# Patient Record
Sex: MALE | Race: WHITE | NOT HISPANIC OR LATINO | URBAN - METROPOLITAN AREA
[De-identification: names, ages, dates, MRNs, and addresses within clinical notes are randomized per-mention and may not be internally consistent; named-entity substitution may affect disease eponyms.]

---

## 2018-12-17 ENCOUNTER — INPATIENT (INPATIENT)
Facility: HOSPITAL | Age: 43
LOS: 4 days | Discharge: HOME | End: 2018-12-22
Attending: INTERNAL MEDICINE | Admitting: INTERNAL MEDICINE

## 2018-12-17 VITALS
SYSTOLIC BLOOD PRESSURE: 125 MMHG | DIASTOLIC BLOOD PRESSURE: 87 MMHG | OXYGEN SATURATION: 98 % | WEIGHT: 130.07 LBS | RESPIRATION RATE: 18 BRPM | TEMPERATURE: 98 F | HEART RATE: 68 BPM | HEIGHT: 64 IN

## 2018-12-17 LAB
ALBUMIN SERPL ELPH-MCNC: 4.5 G/DL — SIGNIFICANT CHANGE UP (ref 3.5–5.2)
ALLERGY+IMMUNOLOGY DIAG STUDY NOTE: SIGNIFICANT CHANGE UP
ALP SERPL-CCNC: 62 U/L — SIGNIFICANT CHANGE UP (ref 30–115)
ALT FLD-CCNC: 17 U/L — SIGNIFICANT CHANGE UP (ref 0–41)
ANION GAP SERPL CALC-SCNC: 11 MMOL/L — SIGNIFICANT CHANGE UP (ref 7–14)
APPEARANCE UR: ABNORMAL
APTT BLD: 28.9 SEC — SIGNIFICANT CHANGE UP (ref 27–39.2)
AST SERPL-CCNC: 17 U/L — SIGNIFICANT CHANGE UP (ref 0–41)
BASOPHILS # BLD AUTO: 0.03 K/UL — SIGNIFICANT CHANGE UP (ref 0–0.2)
BASOPHILS NFR BLD AUTO: 0.4 % — SIGNIFICANT CHANGE UP (ref 0–1)
BILIRUB SERPL-MCNC: 0.7 MG/DL — SIGNIFICANT CHANGE UP (ref 0.2–1.2)
BILIRUB UR-MCNC: NEGATIVE — SIGNIFICANT CHANGE UP
BUN SERPL-MCNC: 11 MG/DL — SIGNIFICANT CHANGE UP (ref 10–20)
CALCIUM SERPL-MCNC: 9.4 MG/DL — SIGNIFICANT CHANGE UP (ref 8.5–10.1)
CHLORIDE SERPL-SCNC: 94 MMOL/L — LOW (ref 98–110)
CO2 SERPL-SCNC: 29 MMOL/L — SIGNIFICANT CHANGE UP (ref 17–32)
COLOR SPEC: YELLOW — SIGNIFICANT CHANGE UP
CREAT SERPL-MCNC: 0.9 MG/DL — SIGNIFICANT CHANGE UP (ref 0.7–1.5)
DIFF PNL FLD: ABNORMAL
EOSINOPHIL # BLD AUTO: 0.42 K/UL — SIGNIFICANT CHANGE UP (ref 0–0.7)
EOSINOPHIL NFR BLD AUTO: 5.7 % — SIGNIFICANT CHANGE UP (ref 0–8)
ERYTHROCYTE [SEDIMENTATION RATE] IN BLOOD: 6 MM/HR — SIGNIFICANT CHANGE UP (ref 0–10)
GLUCOSE SERPL-MCNC: 102 MG/DL — HIGH (ref 70–99)
GLUCOSE UR QL: NEGATIVE — SIGNIFICANT CHANGE UP
HCT VFR BLD CALC: 44.7 % — SIGNIFICANT CHANGE UP (ref 42–52)
HGB BLD-MCNC: 15.5 G/DL — SIGNIFICANT CHANGE UP (ref 14–18)
IMM GRANULOCYTES NFR BLD AUTO: 0.3 % — SIGNIFICANT CHANGE UP (ref 0.1–0.3)
INR BLD: 1.01 RATIO — SIGNIFICANT CHANGE UP (ref 0.65–1.3)
KETONES UR-MCNC: NEGATIVE — SIGNIFICANT CHANGE UP
LACTATE SERPL-SCNC: 1.3 MMOL/L — SIGNIFICANT CHANGE UP (ref 0.5–2.2)
LEUKOCYTE ESTERASE UR-ACNC: NEGATIVE — SIGNIFICANT CHANGE UP
LIDOCAIN IGE QN: 31 U/L — SIGNIFICANT CHANGE UP (ref 7–60)
LYMPHOCYTES # BLD AUTO: 1.47 K/UL — SIGNIFICANT CHANGE UP (ref 1.2–3.4)
LYMPHOCYTES # BLD AUTO: 20 % — LOW (ref 20.5–51.1)
MCHC RBC-ENTMCNC: 29.6 PG — SIGNIFICANT CHANGE UP (ref 27–31)
MCHC RBC-ENTMCNC: 34.7 G/DL — SIGNIFICANT CHANGE UP (ref 32–37)
MCV RBC AUTO: 85.3 FL — SIGNIFICANT CHANGE UP (ref 80–94)
MONOCYTES # BLD AUTO: 0.66 K/UL — HIGH (ref 0.1–0.6)
MONOCYTES NFR BLD AUTO: 9 % — SIGNIFICANT CHANGE UP (ref 1.7–9.3)
NEUTROPHILS # BLD AUTO: 4.76 K/UL — SIGNIFICANT CHANGE UP (ref 1.4–6.5)
NEUTROPHILS NFR BLD AUTO: 64.6 % — SIGNIFICANT CHANGE UP (ref 42.2–75.2)
NITRITE UR-MCNC: NEGATIVE — SIGNIFICANT CHANGE UP
NRBC # BLD: 0 /100 WBCS — SIGNIFICANT CHANGE UP (ref 0–0)
PH UR: 6 — SIGNIFICANT CHANGE UP (ref 5–8)
PLATELET # BLD AUTO: 258 K/UL — SIGNIFICANT CHANGE UP (ref 130–400)
POTASSIUM SERPL-MCNC: 4.5 MMOL/L — SIGNIFICANT CHANGE UP (ref 3.5–5)
POTASSIUM SERPL-SCNC: 4.5 MMOL/L — SIGNIFICANT CHANGE UP (ref 3.5–5)
PROT SERPL-MCNC: 7.2 G/DL — SIGNIFICANT CHANGE UP (ref 6–8)
PROT UR-MCNC: 100
PROTHROM AB SERPL-ACNC: 11.6 SEC — SIGNIFICANT CHANGE UP (ref 9.95–12.87)
RBC # BLD: 5.24 M/UL — SIGNIFICANT CHANGE UP (ref 4.7–6.1)
RBC # FLD: 12.6 % — SIGNIFICANT CHANGE UP (ref 11.5–14.5)
RBC CASTS # UR COMP ASSIST: SIGNIFICANT CHANGE UP /HPF
SODIUM SERPL-SCNC: 134 MMOL/L — LOW (ref 135–146)
SP GR SPEC: >=1.03 — SIGNIFICANT CHANGE UP (ref 1.01–1.03)
TROPONIN T SERPL-MCNC: <0.01 NG/ML — SIGNIFICANT CHANGE UP
TYPE + AB SCN PNL BLD: SIGNIFICANT CHANGE UP
UROBILINOGEN FLD QL: 0.2 — SIGNIFICANT CHANGE UP (ref 0.2–0.2)
WBC # BLD: 7.36 K/UL — SIGNIFICANT CHANGE UP (ref 4.8–10.8)
WBC # FLD AUTO: 7.36 K/UL — SIGNIFICANT CHANGE UP (ref 4.8–10.8)

## 2018-12-17 RX ORDER — SODIUM CHLORIDE 9 MG/ML
1000 INJECTION INTRAMUSCULAR; INTRAVENOUS; SUBCUTANEOUS ONCE
Qty: 0 | Refills: 0 | Status: COMPLETED | OUTPATIENT
Start: 2018-12-17 | End: 2018-12-17

## 2018-12-17 RX ORDER — IOHEXOL 300 MG/ML
30 INJECTION, SOLUTION INTRAVENOUS ONCE
Qty: 0 | Refills: 0 | Status: COMPLETED | OUTPATIENT
Start: 2018-12-17 | End: 2018-12-17

## 2018-12-17 RX ORDER — INFLUENZA VIRUS VACCINE 15; 15; 15; 15 UG/.5ML; UG/.5ML; UG/.5ML; UG/.5ML
0.5 SUSPENSION INTRAMUSCULAR ONCE
Qty: 0 | Refills: 0 | Status: DISCONTINUED | OUTPATIENT
Start: 2018-12-17 | End: 2018-12-22

## 2018-12-17 RX ORDER — SODIUM CHLORIDE 9 MG/ML
1000 INJECTION, SOLUTION INTRAVENOUS ONCE
Qty: 0 | Refills: 0 | Status: COMPLETED | OUTPATIENT
Start: 2018-12-17 | End: 2018-12-17

## 2018-12-17 RX ORDER — TRAMADOL HYDROCHLORIDE 50 MG/1
50 TABLET ORAL
Qty: 0 | Refills: 0 | Status: DISCONTINUED | OUTPATIENT
Start: 2018-12-17 | End: 2018-12-22

## 2018-12-17 RX ORDER — MORPHINE SULFATE 50 MG/1
4 CAPSULE, EXTENDED RELEASE ORAL ONCE
Qty: 0 | Refills: 0 | Status: DISCONTINUED | OUTPATIENT
Start: 2018-12-17 | End: 2018-12-17

## 2018-12-17 RX ORDER — SODIUM CHLORIDE 9 MG/ML
1000 INJECTION INTRAMUSCULAR; INTRAVENOUS; SUBCUTANEOUS
Qty: 0 | Refills: 0 | Status: DISCONTINUED | OUTPATIENT
Start: 2018-12-17 | End: 2018-12-22

## 2018-12-17 RX ORDER — CIPROFLOXACIN LACTATE 400MG/40ML
400 VIAL (ML) INTRAVENOUS EVERY 12 HOURS
Qty: 0 | Refills: 0 | Status: DISCONTINUED | OUTPATIENT
Start: 2018-12-17 | End: 2018-12-22

## 2018-12-17 RX ORDER — METRONIDAZOLE 500 MG
500 TABLET ORAL EVERY 8 HOURS
Qty: 0 | Refills: 0 | Status: DISCONTINUED | OUTPATIENT
Start: 2018-12-17 | End: 2018-12-22

## 2018-12-17 RX ADMIN — SODIUM CHLORIDE 75 MILLILITER(S): 9 INJECTION INTRAMUSCULAR; INTRAVENOUS; SUBCUTANEOUS at 18:24

## 2018-12-17 RX ADMIN — SODIUM CHLORIDE 2000 MILLILITER(S): 9 INJECTION INTRAMUSCULAR; INTRAVENOUS; SUBCUTANEOUS at 10:26

## 2018-12-17 RX ADMIN — IOHEXOL 30 MILLILITER(S): 300 INJECTION, SOLUTION INTRAVENOUS at 12:27

## 2018-12-17 RX ADMIN — SODIUM CHLORIDE 200 MILLILITER(S): 9 INJECTION, SOLUTION INTRAVENOUS at 16:25

## 2018-12-17 RX ADMIN — SODIUM CHLORIDE 75 MILLILITER(S): 9 INJECTION INTRAMUSCULAR; INTRAVENOUS; SUBCUTANEOUS at 23:02

## 2018-12-17 RX ADMIN — TRAMADOL HYDROCHLORIDE 50 MILLIGRAM(S): 50 TABLET ORAL at 23:33

## 2018-12-17 RX ADMIN — TRAMADOL HYDROCHLORIDE 50 MILLIGRAM(S): 50 TABLET ORAL at 23:01

## 2018-12-17 RX ADMIN — Medication 200 MILLIGRAM(S): at 19:03

## 2018-12-17 RX ADMIN — Medication 100 MILLIGRAM(S): at 22:58

## 2018-12-17 NOTE — H&P ADULT - HISTORY OF PRESENT ILLNESS
This patient is 44 y/o male with no specific past medical history except remote history of salmonella infection (he was hospitalized and treated with IV antibiotics at that time). He is admitted to hospital with the chief complaint of abdominal pain. As per patient his abdominal pain started on Saturday after he ate a burrito from outside. Initially his pain was tolerable but last night it became very severe which prompted him to come to hospital. As per patient his pain is mostly in the lower abdomen forming a band, intermittent (last for 5 minutes to hours- and comes back in 5 to 10 minutes), non radiating, 8 to 10 in severity, sharp and stabbing in nature. Patient reports that his pain feels exactly the same when he had salmonella infection in past. He has no nausea, vomiting, diarrhea, constipation or fevers. He is passing stools which are small and darker in color. He was able to eat yesterday and feels hungry at this time and wants to eat. This patient is 42 y/o male with no specific past medical history except remote history of salmonella infection (he was hospitalized and treated with IV antibiotics at that time). He is admitted to hospital with the chief complaint of abdominal pain. As per patient his abdominal pain started on Saturday after he ate a burrito from outside. Initially his pain was tolerable but last night it became very severe which prompted him to come to hospital. As per patient his pain is mostly in the lower abdomen forming a band, intermittent (last for 5 minutes to hours- and comes back in 5 to 10 minutes), non radiating, 8 to 10 in severity, sharp and stabbing in nature. Patient reports that his pain feels exactly the same when he had salmonella infection in past. He has no nausea, vomiting, diarrhea, constipation, urinary symptoms or fevers. He is passing stools which are small and darker in color which is unusual for him. He was able to eat yesterday and feels hungry at this time and wants to eat.   Patient is fully functional at baseline and does not take any medications at home. Did not see any doctor for years.

## 2018-12-17 NOTE — ED PROVIDER NOTE - NS ED ROS FT
Constitutional: See HPI.  Eyes: No visual changes, eye pain or discharge. No Photophobia  ENMT: No hearing changes, pain, discharge or infections. No neck pain or stiffness. No limited ROM  Cardiac: No SOB or edema. No chest pain with exertion.  Respiratory: No cough or respiratory distress. No hemoptysis. No history of asthma or RAD.  GI:+abdominal pain.  No nausea, vomiting, diarrhea  : No dysuria, frequency or burning. No Discharge  MS: No myalgia, muscle weakness, joint pain or back pain.  Neuro: No headache or weakness. No LOC.  Skin: No skin rash.  Except as documented in the HPI, all other systems are negative.

## 2018-12-17 NOTE — ED PROVIDER NOTE - PHYSICAL EXAMINATION
Well appearing NAD non toxic. NCAT EOMI conjunctiva nml. No nasal discharge. MMM. Neck supple, non tender, full ROM. RRR no MRG +S1S2. CTA b/l. Abd s lower abdominal tenderness ND +BS. Ext WWP x4, moving all extremities, no edema. 2+ equal pulses throughout. Cooperative, appropriate.

## 2018-12-17 NOTE — ED ADULT NURSE REASSESSMENT NOTE - NS ED NURSE REASSESS COMMENT FT1
Pt alert and orientedx3, VSS and afebrile. pt tolerated po contrast, awaiting ct scan. educated pt on npo status with teach back. pt complains of abdominal pain, no active nausea/vomiting/dirrahea at this time. Safety maintained and hourly rounding performed. Will continue with plan of care.

## 2018-12-17 NOTE — H&P ADULT - NSHPPHYSICALEXAM_GEN_ALL_CORE
ICU Vital Signs Last 24 Hrs  T(C): 37.1 (17 Dec 2018 16:48), Max: 37.1 (17 Dec 2018 16:48)  T(F): 98.7 (17 Dec 2018 16:48), Max: 98.7 (17 Dec 2018 16:48)  HR: 68 (17 Dec 2018 16:48) (64 - 68)  BP: 147/90 (17 Dec 2018 16:48) (113/77 - 147/90)  RR: 17 (17 Dec 2018 16:48) (17 - 18)  SpO2: 98% (17 Dec 2018 16:48) (98% - 98%)    Physical examination     General: AAOx3, in no apparent distress, moans when feels pain   Abdomen: Tender in the lower abdominal area. Seems like guarding on palpation. Non distended   Chest: S1 + S2, regular, no murmur, normal vesicular breathing sounds   Extremities: No edema, no cyanosis, warm to touch   Poor oral hygiene   Neurological: Grossly normal

## 2018-12-17 NOTE — ED ADULT NURSE NOTE - OBJECTIVE STATEMENT
Pt 44 y/o female c/o abdominal pain since Saturday. Pt denies nausea, vomiting and diarrhea. Pt states his last bowel movement was last night and was formed. Pt denies any past medical history.

## 2018-12-17 NOTE — ED PROVIDER NOTE - PROGRESS NOTE DETAILS
pt's abdomen remains very ttp requiring narcotics, CT shows bowel wall thickening w/o abscess or air, will admit for iv abx, suppoertive care.

## 2018-12-17 NOTE — ED PROVIDER NOTE - OBJECTIVE STATEMENT
42 yo M no pmhx presenting to ED for abdominal pain. Patient states lower abdominal pain for 3 days, radiates to both lower quadrants without associated N/V/D, no fever or chills, no cp or sob. no bloody bowel movements or urinary sxs, denies any flank pain, back pain.

## 2018-12-17 NOTE — H&P ADULT - NSHPLABSRESULTS_GEN_ALL_CORE
15.5   7.36  )-----------( 258      ( 17 Dec 2018 10:03 )             44.7       12-17    134<L>  |  94<L>  |  11  ----------------------------<  102<H>  4.5   |  29  |  0.9    Ca    9.4      17 Dec 2018 10:03    TPro  7.2  /  Alb  4.5  /  TBili  0.7  /  DBili  x   /  AST  17  /  ALT  17  /  AlkPhos  62  12-17      CT Abdomen and Pelvis w/ Oral Cont and w/ IV Cont (12.17.18 @ 14:55)  IMPRESSION:        Significant thickening of small bowel loops predominantly affecting the   ileum/terminal ileum with mild areas of apparent mucosal thickening along   the mid transverse and sigmorectal junction with small volume of   abdominopelvic ascites. Findings are most compatible with   infectious/inflammatory bowel disease/enteritis. Recommend outpatient GI   evaluation with follow-up direct visualization as clinically warranted

## 2018-12-17 NOTE — H&P ADULT - ATTENDING COMMENTS
pt seen and examined independently, I have read and agree with above exam and poa,    lbm, n ,v after pork burrito    stool gram stain/c diff  iv abx  GI eval  zofran  po clears  ivf

## 2018-12-18 LAB
ANION GAP SERPL CALC-SCNC: 13 MMOL/L — SIGNIFICANT CHANGE UP (ref 7–14)
BASOPHILS # BLD AUTO: 0.03 K/UL — SIGNIFICANT CHANGE UP (ref 0–0.2)
BASOPHILS NFR BLD AUTO: 0.5 % — SIGNIFICANT CHANGE UP (ref 0–1)
BUN SERPL-MCNC: 8 MG/DL — LOW (ref 10–20)
CALCIUM SERPL-MCNC: 7.9 MG/DL — LOW (ref 8.5–10.1)
CHLORIDE SERPL-SCNC: 101 MMOL/L — SIGNIFICANT CHANGE UP (ref 98–110)
CO2 SERPL-SCNC: 26 MMOL/L — SIGNIFICANT CHANGE UP (ref 17–32)
CREAT SERPL-MCNC: 0.8 MG/DL — SIGNIFICANT CHANGE UP (ref 0.7–1.5)
CULTURE RESULTS: SIGNIFICANT CHANGE UP
EOSINOPHIL # BLD AUTO: 0.46 K/UL — SIGNIFICANT CHANGE UP (ref 0–0.7)
EOSINOPHIL NFR BLD AUTO: 8.2 % — HIGH (ref 0–8)
GLUCOSE SERPL-MCNC: 84 MG/DL — SIGNIFICANT CHANGE UP (ref 70–99)
HCT VFR BLD CALC: 38.2 % — LOW (ref 42–52)
HGB BLD-MCNC: 13.1 G/DL — LOW (ref 14–18)
IMM GRANULOCYTES NFR BLD AUTO: 0.2 % — SIGNIFICANT CHANGE UP (ref 0.1–0.3)
LYMPHOCYTES # BLD AUTO: 1.43 K/UL — SIGNIFICANT CHANGE UP (ref 1.2–3.4)
LYMPHOCYTES # BLD AUTO: 25.4 % — SIGNIFICANT CHANGE UP (ref 20.5–51.1)
MAGNESIUM SERPL-MCNC: 1.7 MG/DL — LOW (ref 1.8–2.4)
MCHC RBC-ENTMCNC: 29.3 PG — SIGNIFICANT CHANGE UP (ref 27–31)
MCHC RBC-ENTMCNC: 34.3 G/DL — SIGNIFICANT CHANGE UP (ref 32–37)
MCV RBC AUTO: 85.5 FL — SIGNIFICANT CHANGE UP (ref 80–94)
MONOCYTES # BLD AUTO: 0.6 K/UL — SIGNIFICANT CHANGE UP (ref 0.1–0.6)
MONOCYTES NFR BLD AUTO: 10.6 % — HIGH (ref 1.7–9.3)
NEUTROPHILS # BLD AUTO: 3.11 K/UL — SIGNIFICANT CHANGE UP (ref 1.4–6.5)
NEUTROPHILS NFR BLD AUTO: 55.1 % — SIGNIFICANT CHANGE UP (ref 42.2–75.2)
NRBC # BLD: 0 /100 WBCS — SIGNIFICANT CHANGE UP (ref 0–0)
OB PNL STL: NEGATIVE — SIGNIFICANT CHANGE UP
PLATELET # BLD AUTO: 222 K/UL — SIGNIFICANT CHANGE UP (ref 130–400)
POTASSIUM SERPL-MCNC: 4.3 MMOL/L — SIGNIFICANT CHANGE UP (ref 3.5–5)
POTASSIUM SERPL-SCNC: 4.3 MMOL/L — SIGNIFICANT CHANGE UP (ref 3.5–5)
RBC # BLD: 4.47 M/UL — LOW (ref 4.7–6.1)
RBC # FLD: 12.2 % — SIGNIFICANT CHANGE UP (ref 11.5–14.5)
SODIUM SERPL-SCNC: 140 MMOL/L — SIGNIFICANT CHANGE UP (ref 135–146)
SPECIMEN SOURCE: SIGNIFICANT CHANGE UP
WBC # BLD: 5.64 K/UL — SIGNIFICANT CHANGE UP (ref 4.8–10.8)
WBC # FLD AUTO: 5.64 K/UL — SIGNIFICANT CHANGE UP (ref 4.8–10.8)

## 2018-12-18 RX ORDER — ONDANSETRON 8 MG/1
4 TABLET, FILM COATED ORAL EVERY 6 HOURS
Qty: 0 | Refills: 0 | Status: DISCONTINUED | OUTPATIENT
Start: 2018-12-18 | End: 2018-12-22

## 2018-12-18 RX ORDER — MAGNESIUM SULFATE 500 MG/ML
2 VIAL (ML) INJECTION ONCE
Qty: 0 | Refills: 0 | Status: COMPLETED | OUTPATIENT
Start: 2018-12-18 | End: 2018-12-18

## 2018-12-18 RX ORDER — CHLORHEXIDINE GLUCONATE 213 G/1000ML
1 SOLUTION TOPICAL
Qty: 0 | Refills: 0 | Status: DISCONTINUED | OUTPATIENT
Start: 2018-12-18 | End: 2018-12-22

## 2018-12-18 RX ADMIN — Medication 50 GRAM(S): at 11:46

## 2018-12-18 RX ADMIN — SODIUM CHLORIDE 75 MILLILITER(S): 9 INJECTION INTRAMUSCULAR; INTRAVENOUS; SUBCUTANEOUS at 21:42

## 2018-12-18 RX ADMIN — Medication 100 MILLIGRAM(S): at 05:02

## 2018-12-18 RX ADMIN — Medication 200 MILLIGRAM(S): at 17:01

## 2018-12-18 RX ADMIN — Medication 100 MILLIGRAM(S): at 21:42

## 2018-12-18 RX ADMIN — ONDANSETRON 4 MILLIGRAM(S): 8 TABLET, FILM COATED ORAL at 11:47

## 2018-12-18 RX ADMIN — Medication 200 MILLIGRAM(S): at 05:02

## 2018-12-18 RX ADMIN — Medication 100 MILLIGRAM(S): at 13:00

## 2018-12-18 NOTE — CONSULT NOTE ADULT - ASSESSMENT
1) Diet as Tolerated  2) Continue Antibiotics  3) If symptoms subside Colonoscopy as outpatient  4) If remains symptomatic may consider inpatient Colonoscopy 48 yo M with ileitis and patchy colonic thickening. He describes a similar episode about 1 decade ago. A colonoscopy at the time was normal (as per patient).  On the differential is kathy's disease however his ESR 6.  Cannot rule an infectious etiology.    1)Ileitis with patchy colitis? (abnormal imaging)  DDx: Infectious vs inflammatory    Rec:  1) Stool for: GI PCR, Cdiff, Calprotectin/lactoferin  2) Serum ANCA, ASCA  3) Diet as Tolerated  4) Continue Antibiotics  5) If symptoms subside Colonoscopy as outpatient  6) If remains symptomatic may consider inpatient Colonoscopy

## 2018-12-18 NOTE — CONSULT NOTE ADULT - SUBJECTIVE AND OBJECTIVE BOX
Gastroenterology Consultation    44yo Male with   Patient is a 43y old  Male who presents with a chief complaint of Abdominal pain (18 Dec 2018 10:33)    HPI:  This patient is 44 y/o male with no specific past medical history except remote history of salmonella infection (he was hospitalized and treated with IV antibiotics at that time). He is admitted to hospital with the chief complaint of abdominal pain. As per patient his abdominal pain started on Saturday after he ate a burrito from outside. Initially his pain was tolerable but last night it became very severe which prompted him to come to hospital. As per patient his pain is mostly in the lower abdomen forming a band, intermittent (last for 5 minutes to hours- and comes back in 5 to 10 minutes), non radiating, 8 to 10 in severity, sharp and stabbing in nature. Patient reports that his pain feels exactly the same when he had salmonella infection in past. He has no nausea, vomiting, diarrhea, constipation, urinary symptoms or fevers. He is passing stools which are small and darker in color which is unusual for him. He was able to eat yesterday and feels hungry at this time and wants to eat.   Patient is fully functional at baseline and does not take any medications at home. Did not see any doctor for years. (17 Dec 2018 16:47)      GI Hx:  44 yo m no significant PMHx admitted presented for abdominal pain band like in nature in the lower abdomen no associated symptoms since saturday.  Patient endorses eating out with a Hx of salmonella enteritis in the past.  No fever, no diarrhea, no hematochezia.    Previous colonoscopy(ies): None  Previous EGD(s): None  Family Hx:  Social Hx:    REVIEW OF SYSTEMS  General:  No weight loss, fevers, or chills.  Eyes:  No reported pain or visual changes  ENT:  No sore throat or runny nose.  NECK: No stiffness or lymphadenopathy  CV:  No chest pain or palpitations.  Resp:  No shortness of breath, cough, wheezing or hemoptysis  GI:  No abdominal pain, nausea, vomiting, dysphagia, diarrhea or constipation. No rectal bleeding, melena, or hematemesis.  :  No dysuria, urinary incontinence or hematuria.  Muscle:  No aches or weakness  Neuro:  No tingling, numbness or vertigo  Psych:  No mood problems or irritability.  Endocrine:  No polyuria, polydipsia or cold/heat intolerance  Heme:  No ecchymosis or easy bruisability  All other review of systems is negative unless indicated above.    PHYSICAL EXAM:  GENERAL: AAOx3, no acute distress.  HEAD:  Atraumatic, Normocephalic  EYES: EOMI, PERRLA, conjunctiva and sclera clear  NECK: Supple, no JVD or thyromegaly  NODES: No palpable cervical or supraclavicular lymphadenopathy   CHEST/LUNG: Clear to auscultation bilaterally; No wheeze, rhonchi, or rales  HEART: Regular rate and rhythm; normal S1, S2, No murmurs.  ABDOMEN: Soft, nontender, nondistended; Bowel sounds present, no abdominal bruit, masses, or hepatosplenomegaly  EXTREMITIES:  2+ Peripheral Pulses. No clubbing, cyanosis, or edema  PSYCH:  Cooperative and appropriate, normal affect.  NEUROLOGY: No asterixis or tremor. Motor and sensory functions grossly intact  SKIN: Intact, no jaundice  EXTREMITIES: warm, no periph edema.  Rectal exam: not done.    PAST MEDICAL & SURGICAL HISTORY:  Salmonella  No significant past surgical history          Allergies: No Known Allergies    Medications:   chlorhexidine 4% Liquid 1 Application(s) Topical <User Schedule>  ciprofloxacin   IVPB 400 milliGRAM(s) IV Intermittent every 12 hours  influenza   Vaccine 0.5 milliLiter(s) IntraMuscular once  metroNIDAZOLE  IVPB 500 milliGRAM(s) IV Intermittent every 8 hours  ondansetron Injectable 4 milliGRAM(s) IV Push every 6 hours PRN  sodium chloride 0.9%. 1000 milliLiter(s) IV Continuous <Continuous>  traMADol 50 milliGRAM(s) Oral four times a day PRN        Physical Examination:  T(C): 36.5 (12-18-18 @ 13:19), Max: 37.2 (12-17-18 @ 22:58)  HR: 75 (12-18-18 @ 13:19) (68 - 75)  BP: 115/77 (12-18-18 @ 13:19) (115/68 - 147/90)  RR: 18 (12-18-18 @ 13:19) (17 - 18)  SpO2: 95% (12-17-18 @ 23:00) (95% - 98%)      Data:                        13.1   5.64  )-----------( 222      ( 18 Dec 2018 05:52 )             38.2     MCV 85.5 (12-18-18)    RDW 12.2 (12-18-18)    HGB trend:  13.1  12-18-18 @ 05:52  15.5  12-17-18 @ 10:03        12-18    140  |  101  |  8<L>  ----------------------------<  84  4.3   |  26  |  0.8    Ca    7.9<L>      18 Dec 2018 05:52  Mg     1.7     12-18    TPro  7.2  /  Alb  4.5  /  TBili  0.7  /  DBili  x   /  AST  17  /  ALT  17  /  AlkPhos  62  12-17    Liver panel trend:  TBili 0.7   /   AST 17   /   ALT 17   /   AlkP 62   /   Tptn 7.2   /   Alb 4.5    /   DBili --      12-17    PT/INR - ( 17 Dec 2018 11:50 )   PT: 11.60 sec;   INR: 1.01 ratio         PTT - ( 17 Dec 2018 11:50 )  PTT:28.9 sec    Sedimentation Rate, Erythrocyte: 6 mm/Hr (12.17.18 @ 21:16)  < from: CT Abdomen and Pelvis w/ Oral Cont and w/ IV Cont (12.17.18 @ 14:55) >  FINDINGS:    LOWER CHEST: Mild dependent atelectasis.    HEPATOBILIARY: The gallbladder is contracted. The lung parenchyma appears   unremarkable.    SPLEEN: Unremarkable.    PANCREAS: Unremarkable.    ADRENAL GLANDS: Unremarkable.    KIDNEYS: Symmetric enhancement bilaterally. No hydronephrosis.    ABDOMINOPELVIC NODES: No lymphadenopathy.    PELVIC ORGANS: Unremarkable.    PERITONEUM/MESENTERY/BOWEL: There is significant small bowel wall   thickening most predominantly ileum enteritis. Small volume   abdominopelvic ascites. There is apparent areas of thickening along the   mid transverse and sigmo-rectal region suggestive of skip areas of   inflammation. No evidence of bowel obstruction or gross free air.    BONES/SOFT TISSUES: No acute osseous abnormality.      IMPRESSION:        Significant thickening of small bowel loops predominantly affecting the   ileum/terminal ileum with mild areas of apparent mucosal thickening along   the mid transverse and sigmorectal junction with small volume of   abdominopelvic ascites. Findings are most compatible with   infectious/inflammatory bowel disease/enteritis. Recommend outpatient GI   evaluation with follow-up direct visualization as clinically warranted      < end of copied text > Gastroenterology Consultation    42yo Male with   Patient is a 43y old  Male who presents with a chief complaint of Abdominal pain (18 Dec 2018 10:33)    HPI:  This patient is 42 y/o male with no specific past medical history except remote history of salmonella infection (he was hospitalized and treated with IV antibiotics at that time). He is admitted to hospital with the chief complaint of abdominal pain. As per patient his abdominal pain started on Saturday after he ate a burrito from outside. Initially his pain was tolerable but last night it became very severe which prompted him to come to hospital. As per patient his pain is mostly in the lower abdomen forming a band, intermittent (last for 5 minutes to hours- and comes back in 5 to 10 minutes), non radiating, 8 to 10 in severity, sharp and stabbing in nature. Patient reports that his pain feels exactly the same when he had salmonella infection in past. He has no nausea, vomiting, diarrhea, constipation, urinary symptoms or fevers. He is passing stools which are small and darker in color which is unusual for him. He was able to eat yesterday and feels hungry at this time and wants to eat.   Patient is fully functional at baseline and does not take any medications at home. Did not see any doctor for years. (17 Dec 2018 16:47)      GI Hx:  44 yo m no significant PMHx admitted presented for abdominal pain band like in nature in the lower abdomen no associated symptoms since saturday.  Patient endorses eating out with a Hx of salmonella enteritis in the past.  No fever, no diarrhea, no hematochezia.    Previous colonoscopy(ies): None  Previous EGD(s): None  Family Hx:  Social Hx:    REVIEW OF SYSTEMS  General:  No weight loss, fevers, or chills.  Eyes:  No reported pain or visual changes  ENT:  No sore throat or runny nose.  NECK: No stiffness or lymphadenopathy  CV:  No chest pain or palpitations.  Resp:  No shortness of breath, cough, wheezing or hemoptysis  GI:  No abdominal pain, nausea, vomiting, dysphagia, diarrhea or constipation. No rectal bleeding, melena, or hematemesis.  :  No dysuria, urinary incontinence or hematuria.  Muscle:  No aches or weakness  Neuro:  No tingling, numbness or vertigo  Psych:  No mood problems or irritability.  Endocrine:  No polyuria, polydipsia or cold/heat intolerance  Heme:  No ecchymosis or easy bruisability  All other review of systems is negative unless indicated above.    PHYSICAL EXAM:  GENERAL: AAOx3, no acute distress.  HEAD:  Atraumatic, Normocephalic  EYES: EOMI, PERRLA, conjunctiva and sclera clear  NECK: Supple, no JVD or thyromegaly  NODES: No palpable cervical or supraclavicular lymphadenopathy   CHEST/LUNG: Clear to auscultation bilaterally; No wheeze, rhonchi, or rales  HEART: Regular rate and rhythm; normal S1, S2, No murmurs.  ABDOMEN: Diffuse tenderness, nondistended; Bowel sounds present, no abdominal bruit, masses, or hepatosplenomegaly  EXTREMITIES:  2+ Peripheral Pulses. No clubbing, cyanosis, or edema  PSYCH:  Cooperative and appropriate, normal affect.  NEUROLOGY: No asterixis or tremor. Motor and sensory functions grossly intact  SKIN: Intact, no jaundice  EXTREMITIES: warm, no periph edema.  Rectal exam: not done.    PAST MEDICAL & SURGICAL HISTORY:  Salmonella  No significant past surgical history          Allergies: No Known Allergies    Medications:   chlorhexidine 4% Liquid 1 Application(s) Topical <User Schedule>  ciprofloxacin   IVPB 400 milliGRAM(s) IV Intermittent every 12 hours  influenza   Vaccine 0.5 milliLiter(s) IntraMuscular once  metroNIDAZOLE  IVPB 500 milliGRAM(s) IV Intermittent every 8 hours  ondansetron Injectable 4 milliGRAM(s) IV Push every 6 hours PRN  sodium chloride 0.9%. 1000 milliLiter(s) IV Continuous <Continuous>  traMADol 50 milliGRAM(s) Oral four times a day PRN        Physical Examination:  T(C): 36.5 (12-18-18 @ 13:19), Max: 37.2 (12-17-18 @ 22:58)  HR: 75 (12-18-18 @ 13:19) (68 - 75)  BP: 115/77 (12-18-18 @ 13:19) (115/68 - 147/90)  RR: 18 (12-18-18 @ 13:19) (17 - 18)  SpO2: 95% (12-17-18 @ 23:00) (95% - 98%)      Data:                        13.1   5.64  )-----------( 222      ( 18 Dec 2018 05:52 )             38.2     MCV 85.5 (12-18-18)    RDW 12.2 (12-18-18)    HGB trend:  13.1  12-18-18 @ 05:52  15.5  12-17-18 @ 10:03        12-18    140  |  101  |  8<L>  ----------------------------<  84  4.3   |  26  |  0.8    Ca    7.9<L>      18 Dec 2018 05:52  Mg     1.7     12-18    TPro  7.2  /  Alb  4.5  /  TBili  0.7  /  DBili  x   /  AST  17  /  ALT  17  /  AlkPhos  62  12-17    Liver panel trend:  TBili 0.7   /   AST 17   /   ALT 17   /   AlkP 62   /   Tptn 7.2   /   Alb 4.5    /   DBili --      12-17    PT/INR - ( 17 Dec 2018 11:50 )   PT: 11.60 sec;   INR: 1.01 ratio         PTT - ( 17 Dec 2018 11:50 )  PTT:28.9 sec    Sedimentation Rate, Erythrocyte: 6 mm/Hr (12.17.18 @ 21:16)  < from: CT Abdomen and Pelvis w/ Oral Cont and w/ IV Cont (12.17.18 @ 14:55) >  FINDINGS:    LOWER CHEST: Mild dependent atelectasis.    HEPATOBILIARY: The gallbladder is contracted. The lung parenchyma appears   unremarkable.    SPLEEN: Unremarkable.    PANCREAS: Unremarkable.    ADRENAL GLANDS: Unremarkable.    KIDNEYS: Symmetric enhancement bilaterally. No hydronephrosis.    ABDOMINOPELVIC NODES: No lymphadenopathy.    PELVIC ORGANS: Unremarkable.    PERITONEUM/MESENTERY/BOWEL: There is significant small bowel wall   thickening most predominantly ileum enteritis. Small volume   abdominopelvic ascites. There is apparent areas of thickening along the   mid transverse and sigmo-rectal region suggestive of skip areas of   inflammation. No evidence of bowel obstruction or gross free air.    BONES/SOFT TISSUES: No acute osseous abnormality.      IMPRESSION:        Significant thickening of small bowel loops predominantly affecting the   ileum/terminal ileum with mild areas of apparent mucosal thickening along   the mid transverse and sigmorectal junction with small volume of   abdominopelvic ascites. Findings are most compatible with   infectious/inflammatory bowel disease/enteritis. Recommend outpatient GI   evaluation with follow-up direct visualization as clinically warranted      < end of copied text > Gastroenterology Consultation    42yo Male with Ilietis  Patient is a 43y old  Male who presents with a chief complaint of Abdominal pain (18 Dec 2018 10:33)    HPI:  This patient is 42 y/o male with no specific past medical history except remote history of salmonella infection (he was hospitalized and treated with IV antibiotics at that time). He is admitted to hospital with the chief complaint of abdominal pain. As per patient his abdominal pain started on Saturday after he ate a burrito from outside. Initially his pain was tolerable but last night it became very severe which prompted him to come to hospital. As per patient his pain is mostly in the lower abdomen forming a band, intermittent (last for 5 minutes to hours- and comes back in 5 to 10 minutes), non radiating, 8 to 10 in severity, sharp and stabbing in nature. Patient reports that his pain feels exactly the same when he had salmonella infection in past. He has no nausea, vomiting, diarrhea, constipation, urinary symptoms or fevers. He is passing stools which are small and darker in color which is unusual for him. He was able to eat yesterday and feels hungry at this time and wants to eat.   Patient is fully functional at baseline and does not take any medications at home. Did not see any doctor for years. (17 Dec 2018 16:47)      GI Hx:  42 yo m no significant PMHx admitted presented for abdominal pain band like in nature in the lower abdomen no associated symptoms since saturday.  Patient endorses eating out with a Hx of salmonella enteritis in the past.  No fever, no diarrhea, no hematochezia.  Had a BM, Not having flatulence like he would.  Vomited clears today.    Previous colonoscopy(ies): None  Previous EGD(s): None  Family Hx: Crohn's disease in half brother  Social Hx: (-) tobacco, (-) Etoh (-) Illicit drugs    REVIEW OF SYSTEMS  General:  No weight loss, fevers, or chills.  Eyes:  No reported pain or visual changes  ENT:  No sore throat or runny nose.  NECK: No stiffness or lymphadenopathy  CV:  No chest pain or palpitations.  Resp:  No shortness of breath, cough, wheezing or hemoptysis  GI:  No abdominal pain, nausea, vomiting, dysphagia, diarrhea or constipation. No rectal bleeding, melena, or hematemesis.  :  No dysuria, urinary incontinence or hematuria.  Muscle:  No aches or weakness  Neuro:  No tingling, numbness or vertigo  Psych:  No mood problems or irritability.  Endocrine:  No polyuria, polydipsia or cold/heat intolerance  Heme:  No ecchymosis or easy bruisability  All other review of systems is negative unless indicated above.    PHYSICAL EXAM:  GENERAL: AAOx3, no acute distress.  HEAD:  Atraumatic, Normocephalic  EYES: EOMI, PERRLA, conjunctiva and sclera clear  NECK: Supple, no JVD or thyromegaly  NODES: No palpable cervical or supraclavicular lymphadenopathy   CHEST/LUNG: Clear to auscultation bilaterally; No wheeze, rhonchi, or rales  HEART: Regular rate and rhythm; normal S1, S2, No murmurs.  ABDOMEN: Diffuse tenderness, nondistended; Bowel sounds present, no abdominal bruit, masses, or hepatosplenomegaly  EXTREMITIES:  2+ Peripheral Pulses. No clubbing, cyanosis, or edema  PSYCH:  Cooperative and appropriate, normal affect.  NEUROLOGY: No asterixis or tremor. Motor and sensory functions grossly intact  SKIN: Intact, no jaundice  EXTREMITIES: warm, no periph edema.  Rectal exam: not done.    PAST MEDICAL & SURGICAL HISTORY:  Salmonella  No significant past surgical history          Allergies: No Known Allergies    Medications:   chlorhexidine 4% Liquid 1 Application(s) Topical <User Schedule>  ciprofloxacin   IVPB 400 milliGRAM(s) IV Intermittent every 12 hours  influenza   Vaccine 0.5 milliLiter(s) IntraMuscular once  metroNIDAZOLE  IVPB 500 milliGRAM(s) IV Intermittent every 8 hours  ondansetron Injectable 4 milliGRAM(s) IV Push every 6 hours PRN  sodium chloride 0.9%. 1000 milliLiter(s) IV Continuous <Continuous>  traMADol 50 milliGRAM(s) Oral four times a day PRN        Physical Examination:  T(C): 36.5 (12-18-18 @ 13:19), Max: 37.2 (12-17-18 @ 22:58)  HR: 75 (12-18-18 @ 13:19) (68 - 75)  BP: 115/77 (12-18-18 @ 13:19) (115/68 - 147/90)  RR: 18 (12-18-18 @ 13:19) (17 - 18)  SpO2: 95% (12-17-18 @ 23:00) (95% - 98%)      Data:                        13.1   5.64  )-----------( 222      ( 18 Dec 2018 05:52 )             38.2     MCV 85.5 (12-18-18)    RDW 12.2 (12-18-18)    HGB trend:  13.1  12-18-18 @ 05:52  15.5  12-17-18 @ 10:03        12-18    140  |  101  |  8<L>  ----------------------------<  84  4.3   |  26  |  0.8    Ca    7.9<L>      18 Dec 2018 05:52  Mg     1.7     12-18    TPro  7.2  /  Alb  4.5  /  TBili  0.7  /  DBili  x   /  AST  17  /  ALT  17  /  AlkPhos  62  12-17    Liver panel trend:  TBili 0.7   /   AST 17   /   ALT 17   /   AlkP 62   /   Tptn 7.2   /   Alb 4.5    /   DBili --      12-17    PT/INR - ( 17 Dec 2018 11:50 )   PT: 11.60 sec;   INR: 1.01 ratio         PTT - ( 17 Dec 2018 11:50 )  PTT:28.9 sec    Sedimentation Rate, Erythrocyte: 6 mm/Hr (12.17.18 @ 21:16)  < from: CT Abdomen and Pelvis w/ Oral Cont and w/ IV Cont (12.17.18 @ 14:55) >  FINDINGS:    LOWER CHEST: Mild dependent atelectasis.    HEPATOBILIARY: The gallbladder is contracted. The lung parenchyma appears   unremarkable.    SPLEEN: Unremarkable.    PANCREAS: Unremarkable.    ADRENAL GLANDS: Unremarkable.    KIDNEYS: Symmetric enhancement bilaterally. No hydronephrosis.    ABDOMINOPELVIC NODES: No lymphadenopathy.    PELVIC ORGANS: Unremarkable.    PERITONEUM/MESENTERY/BOWEL: There is significant small bowel wall   thickening most predominantly ileum enteritis. Small volume   abdominopelvic ascites. There is apparent areas of thickening along the   mid transverse and sigmo-rectal region suggestive of skip areas of   inflammation. No evidence of bowel obstruction or gross free air.    BONES/SOFT TISSUES: No acute osseous abnormality.      IMPRESSION:        Significant thickening of small bowel loops predominantly affecting the   ileum/terminal ileum with mild areas of apparent mucosal thickening along   the mid transverse and sigmorectal junction with small volume of   abdominopelvic ascites. Findings are most compatible with   infectious/inflammatory bowel disease/enteritis. Recommend outpatient GI   evaluation with follow-up direct visualization as clinically warranted      < end of copied text >

## 2018-12-19 LAB
ALBUMIN SERPL ELPH-MCNC: 3.3 G/DL — LOW (ref 3.5–5.2)
ALP SERPL-CCNC: 47 U/L — SIGNIFICANT CHANGE UP (ref 30–115)
ALT FLD-CCNC: 12 U/L — SIGNIFICANT CHANGE UP (ref 0–41)
ANION GAP SERPL CALC-SCNC: 10 MMOL/L — SIGNIFICANT CHANGE UP (ref 7–14)
AST SERPL-CCNC: 16 U/L — SIGNIFICANT CHANGE UP (ref 0–41)
BASOPHILS # BLD AUTO: 0.03 K/UL — SIGNIFICANT CHANGE UP (ref 0–0.2)
BASOPHILS NFR BLD AUTO: 0.7 % — SIGNIFICANT CHANGE UP (ref 0–1)
BILIRUB SERPL-MCNC: 0.5 MG/DL — SIGNIFICANT CHANGE UP (ref 0.2–1.2)
BUN SERPL-MCNC: 6 MG/DL — LOW (ref 10–20)
C DIFF BY PCR RESULT: NEGATIVE — SIGNIFICANT CHANGE UP
C DIFF TOX GENS STL QL NAA+PROBE: SIGNIFICANT CHANGE UP
CALCIUM SERPL-MCNC: 7.9 MG/DL — LOW (ref 8.5–10.1)
CHLORIDE SERPL-SCNC: 104 MMOL/L — SIGNIFICANT CHANGE UP (ref 98–110)
CO2 SERPL-SCNC: 28 MMOL/L — SIGNIFICANT CHANGE UP (ref 17–32)
CREAT SERPL-MCNC: 0.7 MG/DL — SIGNIFICANT CHANGE UP (ref 0.7–1.5)
CULTURE RESULTS: SIGNIFICANT CHANGE UP
EOSINOPHIL # BLD AUTO: 0.46 K/UL — SIGNIFICANT CHANGE UP (ref 0–0.7)
EOSINOPHIL NFR BLD AUTO: 10.1 % — HIGH (ref 0–8)
GLUCOSE SERPL-MCNC: 91 MG/DL — SIGNIFICANT CHANGE UP (ref 70–99)
HCT VFR BLD CALC: 37.6 % — LOW (ref 42–52)
HGB BLD-MCNC: 12.8 G/DL — LOW (ref 14–18)
IMM GRANULOCYTES NFR BLD AUTO: 0 % — LOW (ref 0.1–0.3)
LYMPHOCYTES # BLD AUTO: 1.05 K/UL — LOW (ref 1.2–3.4)
LYMPHOCYTES # BLD AUTO: 23 % — SIGNIFICANT CHANGE UP (ref 20.5–51.1)
MAGNESIUM SERPL-MCNC: 1.9 MG/DL — SIGNIFICANT CHANGE UP (ref 1.8–2.4)
MCHC RBC-ENTMCNC: 28.8 PG — SIGNIFICANT CHANGE UP (ref 27–31)
MCHC RBC-ENTMCNC: 34 G/DL — SIGNIFICANT CHANGE UP (ref 32–37)
MCV RBC AUTO: 84.7 FL — SIGNIFICANT CHANGE UP (ref 80–94)
MONOCYTES # BLD AUTO: 0.47 K/UL — SIGNIFICANT CHANGE UP (ref 0.1–0.6)
MONOCYTES NFR BLD AUTO: 10.3 % — HIGH (ref 1.7–9.3)
NEUTROPHILS # BLD AUTO: 2.55 K/UL — SIGNIFICANT CHANGE UP (ref 1.4–6.5)
NEUTROPHILS NFR BLD AUTO: 55.9 % — SIGNIFICANT CHANGE UP (ref 42.2–75.2)
NRBC # BLD: 0 /100 WBCS — SIGNIFICANT CHANGE UP (ref 0–0)
PLATELET # BLD AUTO: 226 K/UL — SIGNIFICANT CHANGE UP (ref 130–400)
POTASSIUM SERPL-MCNC: 4.7 MMOL/L — SIGNIFICANT CHANGE UP (ref 3.5–5)
POTASSIUM SERPL-SCNC: 4.7 MMOL/L — SIGNIFICANT CHANGE UP (ref 3.5–5)
PROT SERPL-MCNC: 5.8 G/DL — LOW (ref 6–8)
RBC # BLD: 4.44 M/UL — LOW (ref 4.7–6.1)
RBC # FLD: 12.3 % — SIGNIFICANT CHANGE UP (ref 11.5–14.5)
SODIUM SERPL-SCNC: 142 MMOL/L — SIGNIFICANT CHANGE UP (ref 135–146)
SPECIMEN SOURCE: SIGNIFICANT CHANGE UP
WBC # BLD: 4.56 K/UL — LOW (ref 4.8–10.8)
WBC # FLD AUTO: 4.56 K/UL — LOW (ref 4.8–10.8)

## 2018-12-19 RX ADMIN — CHLORHEXIDINE GLUCONATE 1 APPLICATION(S): 213 SOLUTION TOPICAL at 05:02

## 2018-12-19 RX ADMIN — Medication 100 MILLIGRAM(S): at 05:02

## 2018-12-19 RX ADMIN — Medication 200 MILLIGRAM(S): at 17:03

## 2018-12-19 RX ADMIN — Medication 200 MILLIGRAM(S): at 05:02

## 2018-12-19 RX ADMIN — Medication 100 MILLIGRAM(S): at 21:51

## 2018-12-19 RX ADMIN — Medication 100 MILLIGRAM(S): at 14:33

## 2018-12-20 DIAGNOSIS — K52.9 NONINFECTIVE GASTROENTERITIS AND COLITIS, UNSPECIFIED: ICD-10-CM

## 2018-12-20 LAB
ALBUMIN SERPL ELPH-MCNC: 3.4 G/DL — LOW (ref 3.5–5.2)
ALP SERPL-CCNC: 41 U/L — SIGNIFICANT CHANGE UP (ref 30–115)
ALT FLD-CCNC: 13 U/L — SIGNIFICANT CHANGE UP (ref 0–41)
ANION GAP SERPL CALC-SCNC: 9 MMOL/L — SIGNIFICANT CHANGE UP (ref 7–14)
AST SERPL-CCNC: 15 U/L — SIGNIFICANT CHANGE UP (ref 0–41)
AUTO DIFF PNL BLD: NEGATIVE — SIGNIFICANT CHANGE UP
BAKER'S YEAST IGA QN IA: 10.7 UNITS — SIGNIFICANT CHANGE UP
BAKER'S YEAST IGA QN IA: NEGATIVE — SIGNIFICANT CHANGE UP
BAKER'S YEAST IGG QN IA: 15.8 UNITS — SIGNIFICANT CHANGE UP
BAKER'S YEAST IGG QN IA: NEGATIVE — SIGNIFICANT CHANGE UP
BASOPHILS # BLD AUTO: 0.03 K/UL — SIGNIFICANT CHANGE UP (ref 0–0.2)
BASOPHILS NFR BLD AUTO: 0.6 % — SIGNIFICANT CHANGE UP (ref 0–1)
BILIRUB SERPL-MCNC: 0.4 MG/DL — SIGNIFICANT CHANGE UP (ref 0.2–1.2)
BUN SERPL-MCNC: 5 MG/DL — LOW (ref 10–20)
C-ANCA SER-ACNC: NEGATIVE — SIGNIFICANT CHANGE UP
CALCIUM SERPL-MCNC: 8.5 MG/DL — SIGNIFICANT CHANGE UP (ref 8.5–10.1)
CHLORIDE SERPL-SCNC: 104 MMOL/L — SIGNIFICANT CHANGE UP (ref 98–110)
CO2 SERPL-SCNC: 29 MMOL/L — SIGNIFICANT CHANGE UP (ref 17–32)
CREAT SERPL-MCNC: 0.7 MG/DL — SIGNIFICANT CHANGE UP (ref 0.7–1.5)
EOSINOPHIL # BLD AUTO: 0.58 K/UL — SIGNIFICANT CHANGE UP (ref 0–0.7)
EOSINOPHIL NFR BLD AUTO: 12.3 % — HIGH (ref 0–8)
GLUCOSE SERPL-MCNC: 96 MG/DL — SIGNIFICANT CHANGE UP (ref 70–99)
HCT VFR BLD CALC: 37 % — LOW (ref 42–52)
HGB BLD-MCNC: 12.4 G/DL — LOW (ref 14–18)
IMM GRANULOCYTES NFR BLD AUTO: 0.2 % — SIGNIFICANT CHANGE UP (ref 0.1–0.3)
LYMPHOCYTES # BLD AUTO: 1.25 K/UL — SIGNIFICANT CHANGE UP (ref 1.2–3.4)
LYMPHOCYTES # BLD AUTO: 26.5 % — SIGNIFICANT CHANGE UP (ref 20.5–51.1)
MAGNESIUM SERPL-MCNC: 1.9 MG/DL — SIGNIFICANT CHANGE UP (ref 1.8–2.4)
MCHC RBC-ENTMCNC: 28.5 PG — SIGNIFICANT CHANGE UP (ref 27–31)
MCHC RBC-ENTMCNC: 33.5 G/DL — SIGNIFICANT CHANGE UP (ref 32–37)
MCV RBC AUTO: 85.1 FL — SIGNIFICANT CHANGE UP (ref 80–94)
MONOCYTES # BLD AUTO: 0.5 K/UL — SIGNIFICANT CHANGE UP (ref 0.1–0.6)
MONOCYTES NFR BLD AUTO: 10.6 % — HIGH (ref 1.7–9.3)
NEUTROPHILS # BLD AUTO: 2.35 K/UL — SIGNIFICANT CHANGE UP (ref 1.4–6.5)
NEUTROPHILS NFR BLD AUTO: 49.8 % — SIGNIFICANT CHANGE UP (ref 42.2–75.2)
NRBC # BLD: 0 /100 WBCS — SIGNIFICANT CHANGE UP (ref 0–0)
P-ANCA SER-ACNC: NEGATIVE — SIGNIFICANT CHANGE UP
PLATELET # BLD AUTO: 236 K/UL — SIGNIFICANT CHANGE UP (ref 130–400)
POTASSIUM SERPL-MCNC: 4.5 MMOL/L — SIGNIFICANT CHANGE UP (ref 3.5–5)
POTASSIUM SERPL-SCNC: 4.5 MMOL/L — SIGNIFICANT CHANGE UP (ref 3.5–5)
PROT SERPL-MCNC: 5.5 G/DL — LOW (ref 6–8)
RBC # BLD: 4.35 M/UL — LOW (ref 4.7–6.1)
RBC # FLD: 12.3 % — SIGNIFICANT CHANGE UP (ref 11.5–14.5)
SODIUM SERPL-SCNC: 142 MMOL/L — SIGNIFICANT CHANGE UP (ref 135–146)
WBC # BLD: 4.72 K/UL — LOW (ref 4.8–10.8)
WBC # FLD AUTO: 4.72 K/UL — LOW (ref 4.8–10.8)

## 2018-12-20 RX ORDER — SOD SULF/SODIUM/NAHCO3/KCL/PEG
4000 SOLUTION, RECONSTITUTED, ORAL ORAL ONCE
Qty: 0 | Refills: 0 | Status: COMPLETED | OUTPATIENT
Start: 2018-12-20 | End: 2018-12-20

## 2018-12-20 RX ADMIN — Medication 200 MILLIGRAM(S): at 17:07

## 2018-12-20 RX ADMIN — Medication 100 MILLIGRAM(S): at 13:29

## 2018-12-20 RX ADMIN — Medication 4000 MILLILITER(S): at 18:09

## 2018-12-20 RX ADMIN — CHLORHEXIDINE GLUCONATE 1 APPLICATION(S): 213 SOLUTION TOPICAL at 05:47

## 2018-12-20 RX ADMIN — Medication 200 MILLIGRAM(S): at 05:49

## 2018-12-20 RX ADMIN — Medication 100 MILLIGRAM(S): at 05:48

## 2018-12-20 RX ADMIN — SODIUM CHLORIDE 75 MILLILITER(S): 9 INJECTION INTRAMUSCULAR; INTRAVENOUS; SUBCUTANEOUS at 05:47

## 2018-12-20 RX ADMIN — Medication 100 MILLIGRAM(S): at 21:36

## 2018-12-20 NOTE — DIETITIAN INITIAL EVALUATION ADULT. - SOURCE
"Problem: Patient Care Overview  Goal: Plan of Care Review  Outcome: Ongoing (interventions implemented as appropriate)  Pt reports " I don't feel as tired as I did last week"      " patient/Pt has been NPO/clears during LOS. PTA pt w/ excellent appetite and PO, consumes at least 3 meals daily. Pt expresses desire for diet to be upgraded, reports consuming saltine crackers w/ no poor reaction. Pt reports continuing w/ diarrhea and pain, however, improving, no emetic episodes. LBM 12/19. PTA no vit/min supplementation. NKFA. Pt w/ no chewing/swallowing difficulties. BS 21. Desires ensure clears BID and prosource jello BID if to continue on clear liquid diet. BMI 22.9, wt stable. IBW: 130#

## 2018-12-20 NOTE — DIETITIAN INITIAL EVALUATION ADULT. - ENERGY NEEDS
energy needs: 1690-1840kcal (MSJ x 1.2-1.3AF)  protein needs: 60-73g (1.0-1.2g/kg)  fluid needs: 1 ml /kcal or per LIP

## 2018-12-20 NOTE — DIETITIAN INITIAL EVALUATION ADULT. - MD RECOMMEND
1. Advance diet as tolerated to GI soft. 2. Consider ensure clear BID and prosource jello BID if pt continues on clears. Consider MVI w/ minerals if pt continues on NPO/clears

## 2018-12-20 NOTE — DIETITIAN INITIAL EVALUATION ADULT. - OTHER INFO
Pt chief complaint of abdominal pain. Had 7 episodes BM in 24 hrs per previous progress note 12/19- stool is dark, loose. Pt w/ ileitis + patchy colonic thickening, possibly crohns? GI following- colonoscopy to be considered tmrw.

## 2018-12-21 LAB
ALBUMIN SERPL ELPH-MCNC: 4 G/DL — SIGNIFICANT CHANGE UP (ref 3.5–5.2)
ALP SERPL-CCNC: 45 U/L — SIGNIFICANT CHANGE UP (ref 30–115)
ALT FLD-CCNC: 24 U/L — SIGNIFICANT CHANGE UP (ref 0–41)
ANION GAP SERPL CALC-SCNC: 15 MMOL/L — HIGH (ref 7–14)
APTT BLD: 29.7 SEC — SIGNIFICANT CHANGE UP (ref 27–39.2)
AST SERPL-CCNC: 34 U/L — SIGNIFICANT CHANGE UP (ref 0–41)
BASOPHILS # BLD AUTO: 0.05 K/UL — SIGNIFICANT CHANGE UP (ref 0–0.2)
BASOPHILS NFR BLD AUTO: 1.1 % — HIGH (ref 0–1)
BILIRUB SERPL-MCNC: 0.5 MG/DL — SIGNIFICANT CHANGE UP (ref 0.2–1.2)
BLD GP AB SCN SERPL QL: SIGNIFICANT CHANGE UP
BUN SERPL-MCNC: 6 MG/DL — LOW (ref 10–20)
CALCIUM SERPL-MCNC: 9.1 MG/DL — SIGNIFICANT CHANGE UP (ref 8.5–10.1)
CHLORIDE SERPL-SCNC: 102 MMOL/L — SIGNIFICANT CHANGE UP (ref 98–110)
CO2 SERPL-SCNC: 26 MMOL/L — SIGNIFICANT CHANGE UP (ref 17–32)
CREAT SERPL-MCNC: 0.7 MG/DL — SIGNIFICANT CHANGE UP (ref 0.7–1.5)
CULTURE RESULTS: SIGNIFICANT CHANGE UP
EOSINOPHIL # BLD AUTO: 0.48 K/UL — SIGNIFICANT CHANGE UP (ref 0–0.7)
EOSINOPHIL NFR BLD AUTO: 10.6 % — HIGH (ref 0–8)
GLUCOSE SERPL-MCNC: 86 MG/DL — SIGNIFICANT CHANGE UP (ref 70–99)
HCT VFR BLD CALC: 38.4 % — LOW (ref 42–52)
HGB BLD-MCNC: 13.2 G/DL — LOW (ref 14–18)
IMM GRANULOCYTES NFR BLD AUTO: 0.2 % — SIGNIFICANT CHANGE UP (ref 0.1–0.3)
INR BLD: 1.24 RATIO — SIGNIFICANT CHANGE UP (ref 0.65–1.3)
LACTOFERRIN STL-MCNC: <1 — SIGNIFICANT CHANGE UP (ref 0–7.24)
LYMPHOCYTES # BLD AUTO: 1.23 K/UL — SIGNIFICANT CHANGE UP (ref 1.2–3.4)
LYMPHOCYTES # BLD AUTO: 27.2 % — SIGNIFICANT CHANGE UP (ref 20.5–51.1)
MAGNESIUM SERPL-MCNC: 1.8 MG/DL — SIGNIFICANT CHANGE UP (ref 1.8–2.4)
MCHC RBC-ENTMCNC: 28.9 PG — SIGNIFICANT CHANGE UP (ref 27–31)
MCHC RBC-ENTMCNC: 34.4 G/DL — SIGNIFICANT CHANGE UP (ref 32–37)
MCV RBC AUTO: 84.2 FL — SIGNIFICANT CHANGE UP (ref 80–94)
MONOCYTES # BLD AUTO: 0.54 K/UL — SIGNIFICANT CHANGE UP (ref 0.1–0.6)
MONOCYTES NFR BLD AUTO: 11.9 % — HIGH (ref 1.7–9.3)
NEUTROPHILS # BLD AUTO: 2.22 K/UL — SIGNIFICANT CHANGE UP (ref 1.4–6.5)
NEUTROPHILS NFR BLD AUTO: 49 % — SIGNIFICANT CHANGE UP (ref 42.2–75.2)
NRBC # BLD: 0 /100 WBCS — SIGNIFICANT CHANGE UP (ref 0–0)
PLATELET # BLD AUTO: 237 K/UL — SIGNIFICANT CHANGE UP (ref 130–400)
POTASSIUM SERPL-MCNC: 4.4 MMOL/L — SIGNIFICANT CHANGE UP (ref 3.5–5)
POTASSIUM SERPL-SCNC: 4.4 MMOL/L — SIGNIFICANT CHANGE UP (ref 3.5–5)
PROT SERPL-MCNC: 6.5 G/DL — SIGNIFICANT CHANGE UP (ref 6–8)
PROTHROM AB SERPL-ACNC: 14.2 SEC — HIGH (ref 9.95–12.87)
RBC # BLD: 4.56 M/UL — LOW (ref 4.7–6.1)
RBC # FLD: 12.3 % — SIGNIFICANT CHANGE UP (ref 11.5–14.5)
SODIUM SERPL-SCNC: 143 MMOL/L — SIGNIFICANT CHANGE UP (ref 135–146)
SPECIMEN SOURCE: SIGNIFICANT CHANGE UP
TYPE + AB SCN PNL BLD: SIGNIFICANT CHANGE UP
WBC # BLD: 4.53 K/UL — LOW (ref 4.8–10.8)
WBC # FLD AUTO: 4.53 K/UL — LOW (ref 4.8–10.8)

## 2018-12-21 RX ORDER — MOXIFLOXACIN HYDROCHLORIDE TABLETS, 400 MG 400 MG/1
1 TABLET, FILM COATED ORAL
Qty: 6 | Refills: 0 | OUTPATIENT
Start: 2018-12-21 | End: 2018-12-23

## 2018-12-21 RX ORDER — METRONIDAZOLE 500 MG
1 TABLET ORAL
Qty: 9 | Refills: 0 | OUTPATIENT
Start: 2018-12-21 | End: 2018-12-23

## 2018-12-21 RX ADMIN — Medication 100 MILLIGRAM(S): at 05:21

## 2018-12-21 RX ADMIN — Medication 200 MILLIGRAM(S): at 05:21

## 2018-12-21 RX ADMIN — Medication 100 MILLIGRAM(S): at 21:25

## 2018-12-21 RX ADMIN — Medication 100 MILLIGRAM(S): at 13:11

## 2018-12-21 RX ADMIN — Medication 200 MILLIGRAM(S): at 17:19

## 2018-12-21 NOTE — DISCHARGE NOTE ADULT - HOSPITAL COURSE
46 yo M with n PMHX presents with ileitis and patchy colonic thickening.    # Ileitis Infectious Vs inflammatory   - s/p Cipro and Flagyl IV and changed to PO abx  - Pain controlled with Dilaudid ( d/c'ed) and then with by tramadol PRN; pt not asking for pain meds   - Tolerated clear liq.   -Stool for:  Calprotectin/lactoferin received; result pending  -GI PCR neg, Cdiff neg  -CANCA, PANCA negative  - Serum ANCA, ASCA neg  -FOBT neg  - s/p colonoscopy which showed     Diet: clear liquid and advance slowly  Ambulate as tolerated  From home 46 yo M with n PMHX presents with ileitis and patchy colonic thickening.    # Ileitis Infectious Vs inflammatory   - 1 week Cipro and Flagyl po (3 more days po on d/c)  - Tolerated solid diet.   -Stool for:  Calprotectin/lactoferin received; result pending  -GI PCR neg, Cdiff neg  -CANCA, PANCA negative  - Serum ANCA, ASCA neg  -FOBT neg  - s/p colonoscopy which showed normal colon and ileum, external hemorrhoids    Pt denied abd pain and diarrhea. He is stable and comfortable. D/c to home 42 y/o male with no specific past medical history except remote history of salmonella infection (he was hospitalized and treated with IV antibiotics at that time). He is admitted to hospital with the chief complaint of abdominal pain. As per patient his abdominal pain started on Saturday after he ate a burrito from outside. Initially his pain was tolerable but last night it became very severe which prompted him to come to hospital. As per patient his pain is mostly in the lower abdomen forming a band, intermittent (last for 5 minutes to hours- and comes back in 5 to 10 minutes), non radiating, 8 to 10 in severity, sharp and stabbing in nature. Patient reports that his pain feels exactly the same when he had salmonella infection in past. He has no nausea, vomiting, diarrhea, constipation, urinary symptoms or fevers. He is passing stools which are small and darker in color which is unusual for him. He was able to eat yesterday and feels hungry at this time and wants to eat.     46 yo M with n PMHX presents with abd pain. CT showed Ileitis and patchy colonic thickening.    # Ileitis Infectious Vs inflammatory   - 1 week Cipro and Flagyl po (3 more days po on d/c)  - Tolerated solid diet.   -Stool for:  Calprotectin/lactoferin received; result pending  -GI PCR neg, Cdiff neg  -CANCA, PANCA negative  - Serum ANCA, ASCA neg  -FOBT neg  - s/p colonoscopy which showed normal colon and ileum, external hemorrhoids  f/u with GI Dr. Coronado in 1 week for biopsy report and blood work (salmonella ig and calprotectin)    Pt denied abd pain and diarrhea today. He is stable and comfortable. D/c to home

## 2018-12-21 NOTE — DISCHARGE NOTE ADULT - PLAN OF CARE
To prevent recurrence - please complete your antibiotics are prescribed  - Please follow up with your PMD within 1 week  - Please follow up with GI as instructed Your colonoscopy was normal in colon and external hemorrhoids.  - Please take antibiotics ciprofloxacin 2x/day and flagyl 3x/day for 3 more days to complete a total of a week course  - Please follow up with your PMD Dr. White within 1 week.  - Please follow up with GI Dr. Coronado in 1 week for biopsy result from colonoscopy and blood work result (Salmonella Ig and calprotectin). Salmonella was not present in stool.  Return to ED if worsening symptoms including abdominal pain, diarrhea, fever/chills.

## 2018-12-21 NOTE — DISCHARGE NOTE ADULT - CARE PROVIDERS DIRECT ADDRESSES
,DirectAddress_Unknown ,DirectAddress_Unknown,[email or web address]

## 2018-12-21 NOTE — CHART NOTE - NSCHARTNOTEFT_GEN_A_CORE
PACU ANESTHESIA ADMISSION NOTE      Procedure:   Post op diagnosis:      ____  Intubated  TV:______       Rate: ______      FiO2: ______    _x___  Patent Airway    _x___  Full return of protective reflexes    _x___  Full recovery from anesthesia / back to baseline status    Vitals:            T:                BP :  107/66              R: 18             Sat:  98             P:84      Mental Status:  _x___ Awake   _____ Alert   _____ Drowsy   _____ Sedated    Nausea/Vomiting:  _x___  NO       ______Yes,   See Post - Op Orders         Pain Scale (0-10):  __0___    Treatment: _x___ None    ____ See Post - Op/PCA Orders    Post - Operative Fluids:   __x__ Oral   ____ See Post - Op Orders    Plan: Discharge:   ____Home       ___x__Floor     _____Critical Care    _____  Other:_________________    Comments:  No anesthesia issues or complications noted.  Discharge when criteria met.

## 2018-12-21 NOTE — DISCHARGE NOTE ADULT - MEDICATION SUMMARY - MEDICATIONS TO TAKE
I will START or STAY ON the medications listed below when I get home from the hospital:    Flagyl 500 mg oral tablet  -- 1 tab(s) by mouth 3 times a day for 3 days   -- Do not drink alcoholic beverages when taking this medication.  Finish all this medication unless otherwise directed by prescriber.  May discolor urine or feces.    -- Indication: For ileitis    Cipro 500 mg oral tablet  -- 1 tab(s) by mouth 2 times a day for 3 days  -- Avoid prolonged or excessive exposure to direct and/or artificial sunlight while taking this medication.  Check with your doctor before becoming pregnant.  Do not take dairy products, antacids, or iron preparations within one hour of this medication.  Finish all this medication unless otherwise directed by prescriber.  Medication should be taken with plenty of water.    -- Indication: For ileitis

## 2018-12-21 NOTE — DISCHARGE NOTE ADULT - CARE PROVIDER_API CALL
Samuel White (DO), Infectious Disease; Internal Medicine  [address] [place] [address]  Phone: [phone]  Fax: [phone] Samuel White (), Infectious Disease; Internal Medicine  [address] [place] [address]  Phone: [phone]  Fax: [phone]    Jos Coronado), Gastroenterology; Internal Medicine  [address] [place] [address]  Phone: [phone]  Fax: [phone]

## 2018-12-21 NOTE — DISCHARGE NOTE ADULT - CARE PLAN
Principal Discharge DX:	Colitis  Goal:	To prevent recurrence  Assessment and plan of treatment:	- please complete your antibiotics are prescribed  - Please follow up with your PMD within 1 week  - Please follow up with GI as instructed Principal Discharge DX:	Colitis  Goal:	To prevent recurrence  Assessment and plan of treatment:	Your colonoscopy was normal in colon and external hemorrhoids.  - Please take antibiotics ciprofloxacin 2x/day and flagyl 3x/day for 3 more days to complete a total of a week course  - Please follow up with your PMD Dr. White within 1 week.  - Please follow up with GI Dr. Coronado in 1 week for biopsy result from colonoscopy and blood work result (Salmonella Ig and calprotectin). Salmonella was not present in stool.  Return to ED if worsening symptoms including abdominal pain, diarrhea, fever/chills.

## 2018-12-21 NOTE — DISCHARGE NOTE ADULT - PATIENT PORTAL LINK FT
You can access the [facility] Patient Portal, offered by [facility], by registering with the following website: [email or web address]

## 2018-12-22 VITALS — HEIGHT: 64 IN | WEIGHT: 133.16 LBS

## 2018-12-22 LAB
ALBUMIN SERPL ELPH-MCNC: 4.3 G/DL — SIGNIFICANT CHANGE UP (ref 3.5–5.2)
ALP SERPL-CCNC: 50 U/L — SIGNIFICANT CHANGE UP (ref 30–115)
ALT FLD-CCNC: 32 U/L — SIGNIFICANT CHANGE UP (ref 0–41)
ANION GAP SERPL CALC-SCNC: 13 MMOL/L — SIGNIFICANT CHANGE UP (ref 7–14)
AST SERPL-CCNC: 39 U/L — SIGNIFICANT CHANGE UP (ref 0–41)
BASOPHILS # BLD AUTO: 0.03 K/UL — SIGNIFICANT CHANGE UP (ref 0–0.2)
BASOPHILS NFR BLD AUTO: 0.7 % — SIGNIFICANT CHANGE UP (ref 0–1)
BILIRUB SERPL-MCNC: 0.3 MG/DL — SIGNIFICANT CHANGE UP (ref 0.2–1.2)
BUN SERPL-MCNC: 7 MG/DL — LOW (ref 10–20)
CALCIUM SERPL-MCNC: 9.2 MG/DL — SIGNIFICANT CHANGE UP (ref 8.5–10.1)
CALPROTECTIN STL-MCNT: <16 UG/G — SIGNIFICANT CHANGE UP (ref 0–120)
CHLORIDE SERPL-SCNC: 97 MMOL/L — LOW (ref 98–110)
CO2 SERPL-SCNC: 28 MMOL/L — SIGNIFICANT CHANGE UP (ref 17–32)
CREAT SERPL-MCNC: 0.8 MG/DL — SIGNIFICANT CHANGE UP (ref 0.7–1.5)
EOSINOPHIL # BLD AUTO: 0.53 K/UL — SIGNIFICANT CHANGE UP (ref 0–0.7)
EOSINOPHIL NFR BLD AUTO: 11.5 % — HIGH (ref 0–8)
GLUCOSE SERPL-MCNC: 128 MG/DL — HIGH (ref 70–99)
HCT VFR BLD CALC: 41.7 % — LOW (ref 42–52)
HGB BLD-MCNC: 14.4 G/DL — SIGNIFICANT CHANGE UP (ref 14–18)
IMM GRANULOCYTES NFR BLD AUTO: 0.2 % — SIGNIFICANT CHANGE UP (ref 0.1–0.3)
LYMPHOCYTES # BLD AUTO: 1.22 K/UL — SIGNIFICANT CHANGE UP (ref 1.2–3.4)
LYMPHOCYTES # BLD AUTO: 26.5 % — SIGNIFICANT CHANGE UP (ref 20.5–51.1)
MAGNESIUM SERPL-MCNC: 1.9 MG/DL — SIGNIFICANT CHANGE UP (ref 1.8–2.4)
MCHC RBC-ENTMCNC: 29.3 PG — SIGNIFICANT CHANGE UP (ref 27–31)
MCHC RBC-ENTMCNC: 34.5 G/DL — SIGNIFICANT CHANGE UP (ref 32–37)
MCV RBC AUTO: 84.9 FL — SIGNIFICANT CHANGE UP (ref 80–94)
MONOCYTES # BLD AUTO: 0.52 K/UL — SIGNIFICANT CHANGE UP (ref 0.1–0.6)
MONOCYTES NFR BLD AUTO: 11.3 % — HIGH (ref 1.7–9.3)
NEUTROPHILS # BLD AUTO: 2.29 K/UL — SIGNIFICANT CHANGE UP (ref 1.4–6.5)
NEUTROPHILS NFR BLD AUTO: 49.8 % — SIGNIFICANT CHANGE UP (ref 42.2–75.2)
NRBC # BLD: 0 /100 WBCS — SIGNIFICANT CHANGE UP (ref 0–0)
PLATELET # BLD AUTO: 262 K/UL — SIGNIFICANT CHANGE UP (ref 130–400)
POTASSIUM SERPL-MCNC: 4.3 MMOL/L — SIGNIFICANT CHANGE UP (ref 3.5–5)
POTASSIUM SERPL-SCNC: 4.3 MMOL/L — SIGNIFICANT CHANGE UP (ref 3.5–5)
PROT SERPL-MCNC: 7.3 G/DL — SIGNIFICANT CHANGE UP (ref 6–8)
RBC # BLD: 4.91 M/UL — SIGNIFICANT CHANGE UP (ref 4.7–6.1)
RBC # FLD: 12.3 % — SIGNIFICANT CHANGE UP (ref 11.5–14.5)
SODIUM SERPL-SCNC: 138 MMOL/L — SIGNIFICANT CHANGE UP (ref 135–146)
WBC # BLD: 4.6 K/UL — LOW (ref 4.8–10.8)
WBC # FLD AUTO: 4.6 K/UL — LOW (ref 4.8–10.8)

## 2018-12-22 RX ORDER — MOXIFLOXACIN HYDROCHLORIDE TABLETS, 400 MG 400 MG/1
1 TABLET, FILM COATED ORAL
Qty: 6 | Refills: 0 | OUTPATIENT
Start: 2018-12-22 | End: 2018-12-24

## 2018-12-22 RX ORDER — METRONIDAZOLE 500 MG
1 TABLET ORAL
Qty: 9 | Refills: 0 | OUTPATIENT
Start: 2018-12-22 | End: 2018-12-24

## 2018-12-22 RX ADMIN — Medication 200 MILLIGRAM(S): at 06:29

## 2018-12-22 RX ADMIN — Medication 100 MILLIGRAM(S): at 06:29

## 2018-12-22 NOTE — PROGRESS NOTE ADULT - ASSESSMENT
42 y/o male with no specific past medical history except remote history of salmonella infection (he was hospitalized and treated with IV antibiotics at that time). He is admitted to hospital with the chief complaint of abdominal pain. CT scan shows possible ileitis either of infectious or inflammatory etiology.     Assessment and plan     # Ileitis Infectious Vs inflammatory   - c/w Cipro and Flagyl IV   - Continue IV hydration normal saline   - Pain control by tramadol PRN- Advance diet as tolerated   - Pt did not tolerate clear liquid diet, vomit  - Zofran prn. Need EKG to check QTC  - If no improvement on antibiotics consider GI consult  - Ordered FOBT, calprotectin stool  - ESR 6  -Salmonella total antibodies received    #Hypomagnesemia  2 g IV Mg    From home
42 y/o male with no specific past medical history except remote history of salmonella infection (he was hospitalized and treated with IV antibiotics at that time). He is admitted to hospital with the chief complaint of abdominal pain. CT scan shows possible ileitis either of infectious or inflammatory etiology.     Assessment and plan     # Ileitis Infectious Vs inflammatory   - c/w Cipro and Flagyl IV   - Continue IV hydration normal saline   - Pain control by tramadol PRN- Advance diet as tolerated   - Pt did not tolerate clear liquid diet, vomit  - Zofran prn. Need EKG to check QTC  - If no improvement on antibiotics consider GI consult  - Ordered FOBT, calprotectin stool  - ESR 6  -Salmonella total antibodies received    #Hypomagnesemia  2 g IV Mg    anticipated dc in am
42 yo M with ilietis infectious work up negative if doesnt improve with ABx the will require colonoscopy
46 yo M with ileitis and patchy colonic thickening. He describes a similar episode about 1 decade ago. A colonoscopy at the time was normal (as per patient).  On the differential is kathy's disease however his ESR 6.  Cannot rule an infectious etiology.    1)Ileitis with patchy colitis? (abnormal imaging)  DDx: Infectious vs inflammatory     Rec:  -Clear diet  -continue Iv abx  -in light of little improvement we will consider colonoscopy tomorrow
46 yo M with ileitis and patchy colonic thickening. He describes a similar episode about 1 decade ago. A colonoscopy at the time was normal (as per patient).  Possibly kathy's disease but ESR 6.  Cannot rule an infectious etiology.    # Ileitis Infectious Vs inflammatory   GI following- Colonoscopy today, F/u recs  - c/w Cipro and Flagyl IV   - Continue IV hydration normal saline   - Pain control by tramadol PRN  - Tolerated clear liq and crackers before. Did not tolerate jello. Zofran prn  -Stool for Calprotectin/lactoferin received  Salmonella total antibodies received  GI PCR neg, Cdiff, Serum ANCA, ASCA  neg  -FOBT neg    DIet: f/u GI for diet rec  Ambulate as tolerated  From home    D/c if neg colonoscopy
46 yo M with ileitis and patchy colonic thickening. He describes a similar episode about 1 decade ago. A colonoscopy at the time was normal (as per patient).  Possibly kathy's disease however his ESR 6.  Cannot rule an infectious etiology.    # Ileitis Infectious Vs inflammatory   GI following  - c/w Cipro and Flagyl IV   - Continue IV hydration normal saline   - Pain control by tramadol PRN- Advance diet as tolerated, tolerate diet today. zofran prn  -Stool for: GI PCR, Cdiff, Calprotectin/lactoferin received  - Serum ANCA, ASCA received  - If symptoms subside Colonoscopy as outpatient  - If remains symptomatic may consider inpatient Colonoscop-Salmonella total antibodies received  -FOBT neg    DIet: clear liquid, advance as tolerated  Ambulate as tolerated  From home
colonoscopy  negative    plan---------------------------  discharge today
46 yo M with ileitis and patchy colonic thickening. He describes a similar episode about 1 decade ago. A colonoscopy at the time was normal (as per patient).  Possibly kathy's disease but ESR 6.  Cannot rule an infectious etiology.    # Ileitis Infectious Vs inflammatory   GI following  - c/w Cipro and Flagyl IV   - Continue IV hydration normal saline   - Pain control by tramadol PRN  - Tolerating clear liq. Cont clear liq since possible colonoscopy tomorrow  - zofran prn  -Stool for:  Calprotectin/lactoferin received  GI PCR neg, Cdiff neg  - Serum ANCA, ASCA received  - If remains symptomatic may consider inpatient Colonoscop-  Salmonella total antibodies received  -FOBT neg    DIet: clear liquid  Ambulate as tolerated  From home
going for colonoscopy today

## 2018-12-22 NOTE — PROGRESS NOTE ADULT - PROVIDER SPECIALTY LIST ADULT
Gastroenterology
Gastroenterology
Internal Medicine

## 2018-12-22 NOTE — PROGRESS NOTE ADULT - SUBJECTIVE AND OBJECTIVE BOX
GI Followup Note:   Pt seen and examined at bedside.   43 year old  Male seen  by GI  for ilieitis    Subjective:  Still has pain- with mild iomprovement  Tender on exam  Having small BMs- loose    REVIEW OF SYSTEMS  General:  No weight loss, fevers, or chills.  Eyes:  No reported pain or visual changes  ENT:  No sore throat or runny nose.  NECK: No stiffness or lymphadenopathy  CV:  No chest pain or palpitations.  Resp:  No shortness of breath, cough, wheezing or hemoptysis  GI:  See subjective  :  No dysuria, urinary incontinence or hematuria.  Muscle:  No aches or weakness  Neuro:  No tingling, numbness or vertigo  Psych:  No mood problems or irritability.  Endocrine:  No polyuria, polydipsia or cold/heat intolerance  Heme:  No ecchymosis or easy bruisability  All other review of systems is negative unless indicated above.    PHYSICAL EXAM:  GENERAL: AAOx3, no acute distress.  HEAD:  Atraumatic, Normocephalic  EYES: EOMI, PERRLA, conjunctiva and sclera clear  NECK: Supple, no JVD or thyromegaly  NODES: No palpable cervical or supraclavicular lymphadenopathy   CHEST/LUNG: Clear to auscultation bilaterally; No wheeze, rhonchi, or rales  HEART: Regular rate and rhythm; normal S1, S2, No murmurs.  ABDOMEN: Tender diffusely- + BS  EXTREMITIES:  2+ Peripheral Pulses. No clubbing, cyanosis, or edema  PSYCH:  Cooperative and appropriate, normal affect.  NEUROLOGY: No asterixis or tremor. Motor and sensory functions grossly intact  SKIN: Intact, no jaundice  EXTREMITIES: warm, no periph edema.  Rectal exam: not done.    Allergies: No Known Allergies      Medications:   chlorhexidine 4% Liquid 1 Application(s) Topical <User Schedule>  ciprofloxacin   IVPB 400 milliGRAM(s) IV Intermittent every 12 hours  influenza   Vaccine 0.5 milliLiter(s) IntraMuscular once  metroNIDAZOLE  IVPB 500 milliGRAM(s) IV Intermittent every 8 hours  ondansetron Injectable 4 milliGRAM(s) IV Push every 6 hours PRN  sodium chloride 0.9%. 1000 milliLiter(s) IV Continuous <Continuous>  traMADol 50 milliGRAM(s) Oral four times a day PRN      PHYSICAL EXAM:    Vital Signs Last 24 Hrs  T(C): 36.4 (20 Dec 2018 05:05), Max: 36.5 (19 Dec 2018 20:42)  T(F): 97.5 (20 Dec 2018 05:05), Max: 97.7 (19 Dec 2018 20:42)  HR: 69 (20 Dec 2018 05:05) (69 - 81)  BP: 114/62 (20 Dec 2018 05:05) (114/62 - 135/85)  BP(mean): --  RR: 18 (20 Dec 2018 05:05) (18 - 18)  SpO2: 96% (19 Dec 2018 20:48) (96% - 96%)    12-19 @ 07:01  -  12-20 @ 07:00  --------------------------------------------------------  IN: 1000 mL / OUT: 0 mL / NET: 1000 mL          LABS:                        12.8   4.56  )-----------( 226      ( 19 Dec 2018 09:25 )             37.6     MCV   RDW   12.8  12-19-18 @ 09:25  13.1  12-18-18 @ 05:52        4.56  12-19-18 @ 09:25  5.64  12-18-18 @ 05:52    12-19    142  |  104  |  6<L>  ----------------------------<  91  4.7   |  28  |  0.7    Ca    7.9<L>      19 Dec 2018 09:25  Mg     1.9     12-19    TPro  5.8<L>  /  Alb  3.3<L>  /  TBili  0.5  /  DBili  x   /  AST  16  /  ALT  12  /  AlkPhos  47  12-19    Liver panel trend:  TBili 0.5   /   AST 16   /   ALT 12   /   AlkP 47   /   Tptn 5.8   /   Alb 3.3    /   DBili --      12-19  TBili 0.7   /   AST 17   /   ALT 17   /   AlkP 62   /   Tptn 7.2   /   Alb 4.5    /   DBili --      12-17                      GI PCR Panel, Stool (collected 19 Dec 2018 07:42)  Source: .Stool Feces  Final Report (19 Dec 2018 22:42):    GI PCR Results: NOT detected    *******Please Note:*******    GI panel PCR evaluates for:    Campylobacter, Plesiomonas shigelloides, Salmonella,    Vibrio, Yersinia enterocolitica, Enteroaggregative    Escherichia coli (EAEC), Enteropathogenic E.coli (EPEC),    Enterotoxigenic E. coli (ETEC) lt/st, Shiga-like    toxin-producing E. coli (STEC) stx1/stx2,    Shigella/ Enteroinvasive E. coli (EIEC), Cryptosporidium,    Cyclospora cayetanensis, Entamoeba histolytica,    Giardia lamblia, Adenovirus F 40/41, Astrovirus,    Norovirus GI/GII, Rotavirus A, Sapovirus    Culture - Stool (collected 17 Dec 2018 17:15)  Source: .Stool Feces  Preliminary Report (20 Dec 2018 08:57):    No enteric pathogens to date: Final culture pending    No enteric gram negative rods isolated    Culture - Urine (collected 17 Dec 2018 11:50)  Source: .Urine Clean Catch (Midstream)  Final Report (18 Dec 2018 21:31):    <10,000 CFU/ml Normal Urogenital january present
GI Followup Note:   Pt seen and examined at bedside.   43y year old  Male seen  by GI  for ilieitis      Subjective:  slight improvement tolerated some clears.  loose BMs  still in pain    REVIEW OF SYSTEMS  General:  No weight loss, fevers, or chills.  Eyes:  No reported pain or visual changes  ENT:  No sore throat or runny nose.  NECK: No stiffness or lymphadenopathy  CV:  No chest pain or palpitations.  Resp:  No shortness of breath, cough, wheezing or hemoptysis  GI:  No abdominal pain, nausea, vomiting, dysphagia, diarrhea or constipation. No rectal bleeding, melena, or hematemesis.  :  No dysuria, urinary incontinence or hematuria.  Muscle:  No aches or weakness  Neuro:  No tingling, numbness or vertigo  Psych:  No mood problems or irritability.  Endocrine:  No polyuria, polydipsia or cold/heat intolerance  Heme:  No ecchymosis or easy bruisability  All other review of systems is negative unless indicated above.    PHYSICAL EXAM:  GENERAL: AAOx3, no acute distress.  HEAD:  Atraumatic, Normocephalic  EYES: EOMI, PERRLA, conjunctiva and sclera clear  NECK: Supple, no JVD or thyromegaly  NODES: No palpable cervical or supraclavicular lymphadenopathy   CHEST/LUNG: Clear to auscultation bilaterally; No wheeze, rhonchi, or rales  HEART: Regular rate and rhythm; normal S1, S2, No murmurs.  ABDOMEN: Soft, nontender, nondistended; Bowel sounds present, no abdominal bruit, masses, or hepatosplenomegaly  EXTREMITIES:  2+ Peripheral Pulses. No clubbing, cyanosis, or edema  PSYCH:  Cooperative and appropriate, normal affect.  NEUROLOGY: No asterixis or tremor. Motor and sensory functions grossly intact  SKIN: Intact, no jaundice  EXTREMITIES: warm, no periph edema.  Rectal exam: not done.    Allergies: No Known Allergies      Medications:   chlorhexidine 4% Liquid 1 Application(s) Topical <User Schedule>  ciprofloxacin   IVPB 400 milliGRAM(s) IV Intermittent every 12 hours  influenza   Vaccine 0.5 milliLiter(s) IntraMuscular once  metroNIDAZOLE  IVPB 500 milliGRAM(s) IV Intermittent every 8 hours  ondansetron Injectable 4 milliGRAM(s) IV Push every 6 hours PRN  sodium chloride 0.9%. 1000 milliLiter(s) IV Continuous <Continuous>  traMADol 50 milliGRAM(s) Oral four times a day PRN      PHYSICAL EXAM:    Vital Signs Last 24 Hrs  T(C): 36.5 (19 Dec 2018 20:42), Max: 36.5 (19 Dec 2018 20:42)  T(F): 97.7 (19 Dec 2018 20:42), Max: 97.7 (19 Dec 2018 20:42)  HR: 70 (19 Dec 2018 20:42) (66 - 81)  BP: 123/- (19 Dec 2018 20:42) (118/81 - 135/85)  BP(mean): --  RR: 18 (19 Dec 2018 20:42) (17 - 18)  SpO2: --    12-18 @ 07:01  -  12-19 @ 07:00  --------------------------------------------------------  IN: 900 mL / OUT: 0 mL / NET: 900 mL          LABS:                        12.8   4.56  )-----------( 226      ( 19 Dec 2018 09:25 )             37.6     MCV 84.7 (12-19-18)    RDW 12.3 (12-19-18)    12.8  12-19-18 @ 09:25  13.1  12-18-18 @ 05:52  15.5  12-17-18 @ 10:03        4.56  12-19-18 @ 09:25  5.64  12-18-18 @ 05:52  7.36  12-17-18 @ 10:03    12-19    142  |  104  |  6<L>  ----------------------------<  91  4.7   |  28  |  0.7    Ca    7.9<L>      19 Dec 2018 09:25  Mg     1.9     12-19    TPro  5.8<L>  /  Alb  3.3<L>  /  TBili  0.5  /  DBili  x   /  AST  16  /  ALT  12  /  AlkPhos  47  12-19    Liver panel trend:  TBili 0.5   /   AST 16   /   ALT 12   /   AlkP 47   /   Tptn 5.8   /   Alb 3.3    /   DBili --      12-19  TBili 0.7   /   AST 17   /   ALT 17   /   AlkP 62   /   Tptn 7.2   /   Alb 4.5    /   DBili --      12-17                      GI PCR Panel, Stool (collected 19 Dec 2018 07:42)  Source: .Stool Feces  Final Report (19 Dec 2018 22:42):    GI PCR Results: NOT detected    *******Please Note:*******    GI panel PCR evaluates for:    Campylobacter, Plesiomonas shigelloides, Salmonella,    Vibrio, Yersinia enterocolitica, Enteroaggregative    Escherichia coli (EAEC), Enteropathogenic E.coli (EPEC),    Enterotoxigenic E. coli (ETEC) lt/st, Shiga-like    toxin-producing E. coli (STEC) stx1/stx2,    Shigella/ Enteroinvasive E. coli (EIEC), Cryptosporidium,    Cyclospora cayetanensis, Entamoeba histolytica,    Giardia lamblia, Adenovirus F 40/41, Astrovirus,    Norovirus GI/GII, Rotavirus A, Sapovirus    Culture - Urine (collected 17 Dec 2018 11:50)  Source: .Urine Clean Catch (Midstream)  Final Report (18 Dec 2018 21:31):    <10,000 CFU/ml Normal Urogenital january present
Patient was seen and examined. Spoke with RN. Chart reviewed.    No events overnight.  Vital Signs Last 24 Hrs  T(F): 96.5 (19 Dec 2018 06:19), Max: 98.3 (18 Dec 2018 20:40)  HR: 66 (19 Dec 2018 06:19) (66 - 75)  BP: 118/81 (19 Dec 2018 06:19) (115/77 - 122/75)  SpO2: --  MEDICATIONS  (STANDING):  chlorhexidine 4% Liquid 1 Application(s) Topical <User Schedule>  ciprofloxacin   IVPB 400 milliGRAM(s) IV Intermittent every 12 hours  influenza   Vaccine 0.5 milliLiter(s) IntraMuscular once  metroNIDAZOLE  IVPB 500 milliGRAM(s) IV Intermittent every 8 hours  sodium chloride 0.9%. 1000 milliLiter(s) (75 mL/Hr) IV Continuous <Continuous>    MEDICATIONS  (PRN):  ondansetron Injectable 4 milliGRAM(s) IV Push every 6 hours PRN Nausea and/or Vomiting  traMADol 50 milliGRAM(s) Oral four times a day PRN Moderate Pain (4 - 6)    Labs:                        12.8   4.56  )-----------( 226      ( 19 Dec 2018 09:25 )             37.6                         13.1   5.64  )-----------( 222      ( 18 Dec 2018 05:52 )             38.2     19 Dec 2018 09:25    142    |  104    |  6      ----------------------------<  91     4.7     |  28     |  0.7    18 Dec 2018 05:52    140    |  101    |  8      ----------------------------<  84     4.3     |  26     |  0.8      Ca    7.9        19 Dec 2018 09:25  Ca    7.9        18 Dec 2018 05:52  Mg     1.9       19 Dec 2018 09:25  Mg     1.7       18 Dec 2018 05:52    TPro  5.8    /  Alb  3.3    /  TBili  0.5    /  DBili  x      /  AST  16     /  ALT  12     /  AlkPhos  47     19 Dec 2018 09:25          Culture - Urine (collected 17 Dec 2018 11:50)  Source: .Urine Clean Catch (Midstream)  Final Report (18 Dec 2018 21:31):    <10,000 CFU/ml Normal Urogenital january present        Radiology:    General: comfortable, NAD  Neurology: A&Ox3, nonfocal  Head:  Normocephalic, atraumatic  ENT:  Mucosa moist, no ulcerations  Neck:  Supple, no JVD,   Skin: no breakdown  Resp: CTA B/L  CV: RRR, S1S2,   GI: Soft, NT, bowel sounds  MS: No edema, + peripheral pulses, FROM all 4 extremity
SUBJECTIVE:    Patient is a 43y old Male who presents with a chief complaint of Abdominal pain (17 Dec 2018 16:47)    Currently admitted to medicine with the primary diagnosis of Abdominal pain     Today is hospital day 1d. Pt is on clear liquid diet and threw up. He had diarrhea, dark brown. Mild lower abdominal pain currently. Felt better with abx. Denied CP, SOB, urinary issues.    PAST MEDICAL & SURGICAL HISTORY  Salmonella  No significant past surgical history        ALLERGIES:  No Known Allergies    MEDICATIONS:  STANDING MEDICATIONS  chlorhexidine 4% Liquid 1 Application(s) Topical <User Schedule>  ciprofloxacin   IVPB 400 milliGRAM(s) IV Intermittent every 12 hours  influenza   Vaccine 0.5 milliLiter(s) IntraMuscular once  magnesium sulfate  IVPB 2 Gram(s) IV Intermittent once  metroNIDAZOLE  IVPB 500 milliGRAM(s) IV Intermittent every 8 hours  sodium chloride 0.9%. 1000 milliLiter(s) IV Continuous <Continuous>    PRN MEDICATIONS  ondansetron Injectable 4 milliGRAM(s) IV Push every 6 hours PRN  traMADol 50 milliGRAM(s) Oral four times a day PRN    VITALS:   T(F): 96.6  HR: 72  BP: 115/68  RR: 17  SpO2: 95%    LABS:                        13.1   5.64  )-----------( 222      ( 18 Dec 2018 05:52 )             38.2     12-18    140  |  101  |  8<L>  ----------------------------<  84  4.3   |  26  |  0.8    Ca    7.9<L>      18 Dec 2018 05:52  Mg     1.7     12-18    TPro  7.2  /  Alb  4.5  /  TBili  0.7  /  DBili  x   /  AST  17  /  ALT  17  /  AlkPhos  62  12-17    PT/INR - ( 17 Dec 2018 11:50 )   PT: 11.60 sec;   INR: 1.01 ratio         PTT - ( 17 Dec 2018 11:50 )  PTT:28.9 sec  Urinalysis Basic - ( 17 Dec 2018 10:03 )    Color: Yellow / Appearance: Cloudy / SG: >=1.030 / pH: x  Gluc: x / Ketone: Negative  / Bili: Negative / Urobili: 0.2   Blood: x / Protein: 100 / Nitrite: Negative   Leuk Esterase: Negative / RBC: 1-2 /HPF / WBC x   Sq Epi: x / Non Sq Epi: x / Bacteria: x        Sedimentation Rate, Erythrocyte: 6 mm/Hr (12-17-18 @ 21:16)  Troponin T, Serum: <0.01 ng/mL (12-17-18 @ 11:50)  Lactate, Blood: 1.3 mmol/L (12-17-18 @ 11:50)      CARDIAC MARKERS ( 17 Dec 2018 11:50 )  x     / <0.01 ng/mL / x     / x     / x          RADIOLOGY:    PHYSICAL EXAM:  General: AAOx3, in no apparent distress, moans when feels pain   	Abdomen: Tender in the lower abdominal area. No guarding on palpation. Non distended   	Chest: S1 + S2, regular, no murmur, normal vesicular breathing sounds   	Extremities: No edema, no cyanosis, warm to touch   	Poor oral hygiene   Neurological: Grossly normal      	CT Abdomen and Pelvis w/ Oral Cont and w/ IV Cont (12.17.18 @ 14:55)  	IMPRESSION:        	Significant thickening of small bowel loops predominantly affecting the   	ileum/terminal ileum with mild areas of apparent mucosal thickening along   	the mid transverse and sigmorectal junction with small volume of   	abdominopelvic ascites. Findings are most compatible with   	infectious/inflammatory bowel disease/enteritis. Recommend outpatient GI   evaluation with follow-up direct visualization as clinically warranted
SUBJECTIVE:    Patient is a 43y old Male who presents with a chief complaint of Abdominal pain (19 Dec 2018 11:54)    Currently admitted to medicine with the primary diagnosis of Abdominal pain     Today is hospital day 2d. Overnight no event. Report RLQ pain still present. Had 7 ep BM in 24 hr, dark with granule, loose, sinks down. Denied CP, urinary issues.     PAST MEDICAL & SURGICAL HISTORY  Salmonella  No significant past surgical history        ALLERGIES:  No Known Allergies    MEDICATIONS:  STANDING MEDICATIONS  chlorhexidine 4% Liquid 1 Application(s) Topical <User Schedule>  ciprofloxacin   IVPB 400 milliGRAM(s) IV Intermittent every 12 hours  influenza   Vaccine 0.5 milliLiter(s) IntraMuscular once  metroNIDAZOLE  IVPB 500 milliGRAM(s) IV Intermittent every 8 hours  sodium chloride 0.9%. 1000 milliLiter(s) IV Continuous <Continuous>    PRN MEDICATIONS  ondansetron Injectable 4 milliGRAM(s) IV Push every 6 hours PRN  traMADol 50 milliGRAM(s) Oral four times a day PRN    VITALS:   T(F): 96.5  HR: 66  BP: 118/81  RR: 17  SpO2: --    LABS:                        12.8   4.56  )-----------( 226      ( 19 Dec 2018 09:25 )             37.6     12-19    142  |  104  |  6<L>  ----------------------------<  91  4.7   |  28  |  0.7    Ca    7.9<L>      19 Dec 2018 09:25  Mg     1.9     12-19    TPro  5.8<L>  /  Alb  3.3<L>  /  TBili  0.5  /  DBili  x   /  AST  16  /  ALT  12  /  AlkPhos  47  12-19              Culture - Urine (collected 17 Dec 2018 11:50)  Source: .Urine Clean Catch (Midstream)  Final Report (18 Dec 2018 21:31):    <10,000 CFU/ml Normal Urogenital january present          RADIOLOGY:    PHYSICAL EXAM:  GENERAL: NAD, speaks in full sentences, no signs of respiratory distress  HEAD:  Atraumatic, Normocephalic  EYES: EOMI, PERRLA, conjunctiva and sclera clear  NECK: Supple, No JVD  PULM: CTAB; No wheeze; No crackles; No accessory muscles used  CVA: Regular rate and rhythm; No murmurs;   ABDOMEN: Soft, RLQ tender to palpation, Nondistended; Bowel sounds present; No guarding  EXTREMITIES:  2+ Peripheral Pulses, No cyanosis or edema  PSYCH: AAOx3  NEUROLOGY: non-focal  SKIN: No rashes or lesions    Intravenous access:   NG tube:   Aguirre Catheter:
SUBJECTIVE:    Patient is a 43y old Male who presents with a chief complaint of Abdominal pain (21 Dec 2018 12:18)    Currently admitted to medicine with the primary diagnosis of Colitis     Today is hospital day 4d. Overnight no event. Report lower abd pain improve but still present. Denied CP, SOB, urinary symptoms. Had BM from [facility].    PAST MEDICAL & SURGICAL HISTORY  Salmonella  No significant past surgical history        ALLERGIES:  No Known Allergies    MEDICATIONS:  STANDING MEDICATIONS  chlorhexidine 4% Liquid 1 Application(s) Topical <User Schedule>  ciprofloxacin   IVPB 400 milliGRAM(s) IV Intermittent every 12 hours  influenza   Vaccine 0.5 milliLiter(s) IntraMuscular once  metroNIDAZOLE  IVPB 500 milliGRAM(s) IV Intermittent every 8 hours  sodium chloride 0.9%. 1000 milliLiter(s) IV Continuous <Continuous>    PRN MEDICATIONS  ondansetron Injectable 4 milliGRAM(s) IV Push every 6 hours PRN  traMADol 50 milliGRAM(s) Oral four times a day PRN    VITALS:   T(F): 97.7  HR: 61  BP: 118/71  RR: 18  SpO2: 98%    LABS:                        13.2   4.53  )-----------( 237      ( 21 Dec 2018 08:52 )             38.4     12-21    143  |  102  |  6<L>  ----------------------------<  86  4.4   |  26  |  0.7    Ca    9.1      21 Dec 2018 08:52  Mg     1.8     12-21    TPro  6.5  /  Alb  4.0  /  TBili  0.5  /  DBili  x   /  AST  34  /  ALT  24  /  AlkPhos  45  12-21    PT/INR - ( 21 Dec 2018 08:52 )   PT: 14.20 sec;   INR: 1.24 ratio         PTT - ( 21 Dec 2018 08:52 )  PTT:29.7 sec          GI PCR Panel, Stool (collected 19 Dec 2018 07:42)  Source: .Stool Feces  Final Report (19 Dec 2018 22:42):    GI PCR Results: NOT detected    *******Please Note:*******    GI panel PCR evaluates for:    Campylobacter, Plesiomonas shigelloides, Salmonella,    Vibrio, Yersinia enterocolitica, Enteroaggregative    Escherichia coli (EAEC), Enteropathogenic E.coli (EPEC),    Enterotoxigenic E. coli (ETEC) lt/st, Shiga-like    toxin-producing E. coli (STEC) stx1/stx2,    Shigella/ Enteroinvasive E. coli (EIEC), Cryptosporidium,    Cyclospora cayetanensis, Entamoeba histolytica,    Giardia lamblia, Adenovirus F 40/41, Astrovirus,    Norovirus GI/GII, Rotavirus A, Sapovirus          RADIOLOGY:    PHYSICAL EXAM:  GENERAL: NAD, speaks in full sentences, no signs of respiratory distress  HEAD:  Atraumatic, Normocephalic  EYES: EOMI, PERRLA, conjunctiva and sclera clear  NECK: Supple, No JVD  PULM: CTAB; No wheeze; No crackles; No accessory muscles used  CVA: Regular rate and rhythm; No murmurs;   ABDOMEN: Soft, b/l lower quad mild tender to palpation, Nondistended; Bowel sounds present; No guarding  EXTREMITIES:  2+ Peripheral Pulses, No cyanosis or edema  PSYCH: AAOx3  NEUROLOGY: non-focal  SKIN: No rashes or lesions    Intravenous access:   NG tube:   Aguirre Catheter:
TREY JUNIOR  43y  Male      Patient is a 43y old  Male who presents with a chief complaint of Abdominal pain (20 Dec 2018 10:06)        IABDOMINAL PAIN  ^STOMACH PAIN  No pertinent family history in first degree relatives  Handoff  MEWS Score  Salmonella  Abdominal pain  No significant past surgical history  STOMACH PAIN  NTERVAL HPI/OVERNIGHT EVENTS:        REVIEW OF SYSTEMS:  CONSTITUTIONAL: No fever, weight loss, or fatigue  EYES: No eye pain, visual disturbances, or discharge  ENMT:  No difficulty hearing, tinnitus, vertigo; No sinus or throat pain  NECK: No pain or stiffness  BREASTS: No pain, masses, or nipple discharge  RESPIRATORY: No cough, wheezing, chills or hemoptysis; No shortness of breath  CARDIOVASCULAR: No chest pain, palpitations, dizziness, or leg swelling  GASTROINTESTINAL: No abdominal or epigastric pain. No nausea, vomiting, or hematemesis; No diarrhea or constipation. No melena or hematochezia.  GENITOURINARY: No dysuria, frequency, hematuria, or incontinence  NEUROLOGICAL: No headaches, memory loss, loss of strength, numbness, or tremors  SKIN: No itching, burning, rashes, or lesions   LYMPH NODES: No enlarged glands  ENDOCRINE: No heat or cold intolerance; No hair loss  MUSCULOSKELETAL: No joint pain or swelling; No muscle, back, or extremity pain  PSYCHIATRIC: No depression, anxiety, mood swings, or difficulty sleeping  HEME/LYMPH: No easy bruising, or bleeding gums  ALLERY AND IMMUNOLOGIC: No hives or eczema  FAMILY HISTORY:  No pertinent family history in first degree relatives    T(C): 36.4 (12-20-18 @ 05:05), Max: 36.5 (12-19-18 @ 20:42)  HR: 69 (12-20-18 @ 05:05) (69 - 81)  BP: 114/62 (12-20-18 @ 05:05) (114/62 - 135/85)  RR: 18 (12-20-18 @ 05:05) (18 - 18)  SpO2: 96% (12-19-18 @ 20:48) (96% - 96%)  Wt(kg): --Vital Signs Last 24 Hrs  T(C): 36.4 (20 Dec 2018 05:05), Max: 36.5 (19 Dec 2018 20:42)  T(F): 97.5 (20 Dec 2018 05:05), Max: 97.7 (19 Dec 2018 20:42)  HR: 69 (20 Dec 2018 05:05) (69 - 81)  BP: 114/62 (20 Dec 2018 05:05) (114/62 - 135/85)  BP(mean): --  RR: 18 (20 Dec 2018 05:05) (18 - 18)  SpO2: 96% (19 Dec 2018 20:48) (96% - 96%)    PHYSICAL EXAM:  GENERAL: NAD, well-groomed, well-developed  HEAD:  Atraumatic, Normocephalic  EYES: EOMI, PERRLA, conjunctiva and sclera clear  ENMT: No tonsillar erythema, exudates, or enlargement; Moist mucous membranes, Good dentition, No lesions  NECK: Supple, No JVD, Normal thyroid  NERVOUS SYSTEM:  Alert & Oriented X3, Good concentration; Motor Strength 5/5 B/L upper and lower extremities; DTRs 2+ intact and symmetric  CHEST/LUNG: Clear to percussion bilaterally; No rales, rhonchi, wheezing, or rubs  HEART: Regular rate and rhythm; No murmurs, rubs, or gallops  ABDOMEN: Soft, Nontender, Nondistended; Bowel sounds present  EXTREMITIES:  2+ Peripheral Pulses, No clubbing, cyanosis, or edema  LYMPH: No lymphadenopathy noted  SKIN: No rashes or lesions    Consultant(s) Notes Reviewed:  [x ] YES  [ ] NO  Care Discussed with Consultants/Other Providers [ x] YES  [ ] NO          Imaging Personally Reviewed:  [ ] YES  [ ] NO    HEALTH ISSUES - PROBLEM Dx:
TREY JUNIOR  43y  Male    Patient is a 43y old  Male who presents with a chief complaint of Abdominal pain (21 Dec 2018 17:06)    ALLERGIES:  No Known Allergies      INTERVAL HPI/OVERNIGHT EVENTS:    VITALS:  T(F): 96.8 (12-22-18 @ 05:00), Max: 97.8 (12-21-18 @ 22:00)  HR: 61 (12-22-18 @ 05:00) (59 - 103)  BP: 96/67 (12-22-18 @ 05:00) (96/67 - 136/90)  RR: 18 (12-22-18 @ 05:00) (18 - 18)  SpO2: 97% (12-21-18 @ 20:53) (97% - 100%)    LABS:  12-22    138  |  97<L>  |  7<L>  ----------------------------<  128<H>  4.3   |  28  |  0.8    Ca    9.2      22 Dec 2018 08:45  Mg     1.9     12-22    TPro  7.3  /  Alb  4.3  /  TBili  0.3  /  DBili  x   /  AST  39  /  ALT  32  /  AlkPhos  50  12-22    MICROBIOLOGY:    MEDICATION:  chlorhexidine 4% Liquid 1 Application(s) Topical <User Schedule>  ciprofloxacin   IVPB 400 milliGRAM(s) IV Intermittent every 12 hours  influenza   Vaccine 0.5 milliLiter(s) IntraMuscular once  metroNIDAZOLE  IVPB 500 milliGRAM(s) IV Intermittent every 8 hours  ondansetron Injectable 4 milliGRAM(s) IV Push every 6 hours PRN  sodium chloride 0.9%. 1000 milliLiter(s) IV Continuous <Continuous>  traMADol 50 milliGRAM(s) Oral four times a day PRN    RADIOLOGY & ADDITIONAL TESTS:
TRYE JUNIOR  43y  Male    Patient is a 43y old  Male who presents with a chief complaint of Abdominal pain (20 Dec 2018 13:17)    ALLERGIES:  No Known Allergies      INTERVAL HPI/OVERNIGHT EVENTS:    VITALS:  T(F): 97 (12-21-18 @ 05:00), Max: 98.3 (12-20-18 @ 20:04)  HR: 75 (12-21-18 @ 05:00) (60 - 75)  BP: 110/64 (12-21-18 @ 05:00) (110/64 - 139/86)  RR: 18 (12-21-18 @ 05:00) (18 - 18)  SpO2: 98% (12-20-18 @ 19:48) (98% - 98%)    LABS:  12-20    142  |  104  |  5<L>  ----------------------------<  96  4.5   |  29  |  0.7    Ca    8.5      20 Dec 2018 08:32  Mg     1.9     12-20    TPro  5.5<L>  /  Alb  3.4<L>  /  TBili  0.4  /  DBili  x   /  AST  15  /  ALT  13  /  AlkPhos  41  12-20    MICROBIOLOGY:    MEDICATION:  chlorhexidine 4% Liquid 1 Application(s) Topical <User Schedule>  ciprofloxacin   IVPB 400 milliGRAM(s) IV Intermittent every 12 hours  influenza   Vaccine 0.5 milliLiter(s) IntraMuscular once  metroNIDAZOLE  IVPB 500 milliGRAM(s) IV Intermittent every 8 hours  ondansetron Injectable 4 milliGRAM(s) IV Push every 6 hours PRN  sodium chloride 0.9%. 1000 milliLiter(s) IV Continuous <Continuous>  traMADol 50 milliGRAM(s) Oral four times a day PRN    RADIOLOGY & ADDITIONAL TESTS:
SUBJECTIVE:    Patient is a 43y old Male who presents with a chief complaint of Abdominal pain (20 Dec 2018 10:56)    Currently admitted to medicine with the primary diagnosis of Abdominal pain     Today is hospital day 3d. Overnight no event. Overall, pt feels better than when first admitted, but still has abd pain. Had 5+ BM in last 24 hr. Denied CP, SOB, urinary symptoms.    PAST MEDICAL & SURGICAL HISTORY  Salmonella  No significant past surgical history        ALLERGIES:  No Known Allergies    MEDICATIONS:  STANDING MEDICATIONS  chlorhexidine 4% Liquid 1 Application(s) Topical <User Schedule>  ciprofloxacin   IVPB 400 milliGRAM(s) IV Intermittent every 12 hours  influenza   Vaccine 0.5 milliLiter(s) IntraMuscular once  metroNIDAZOLE  IVPB 500 milliGRAM(s) IV Intermittent every 8 hours  sodium chloride 0.9%. 1000 milliLiter(s) IV Continuous <Continuous>    PRN MEDICATIONS  ondansetron Injectable 4 milliGRAM(s) IV Push every 6 hours PRN  traMADol 50 milliGRAM(s) Oral four times a day PRN    VITALS:   T(F): 97.5  HR: 69  BP: 114/62  RR: 18  SpO2: 96%    LABS:                        12.4   4.72  )-----------( 236      ( 20 Dec 2018 08:32 )             37.0     12-20    142  |  104  |  5<L>  ----------------------------<  96  4.5   |  29  |  0.7    Ca    8.5      20 Dec 2018 08:32  Mg     1.9     12-20    TPro  5.5<L>  /  Alb  3.4<L>  /  TBili  0.4  /  DBili  x   /  AST  15  /  ALT  13  /  AlkPhos  41  12-20              GI PCR Panel, Stool (collected 19 Dec 2018 07:42)  Source: .Stool Feces  Final Report (19 Dec 2018 22:42):    GI PCR Results: NOT detected    *******Please Note:*******    GI panel PCR evaluates for:    Campylobacter, Plesiomonas shigelloides, Salmonella,    Vibrio, Yersinia enterocolitica, Enteroaggregative    Escherichia coli (EAEC), Enteropathogenic E.coli (EPEC),    Enterotoxigenic E. coli (ETEC) lt/st, Shiga-like    toxin-producing E. coli (STEC) stx1/stx2,    Shigella/ Enteroinvasive E. coli (EIEC), Cryptosporidium,    Cyclospora cayetanensis, Entamoeba histolytica,    Giardia lamblia, Adenovirus F 40/41, Astrovirus,    Norovirus GI/GII, Rotavirus A, Sapovirus    Culture - Stool (collected 17 Dec 2018 17:15)  Source: .Stool Feces  Preliminary Report (20 Dec 2018 08:57):    No enteric pathogens to date: Final culture pending    No enteric gram negative rods isolated          RADIOLOGY:    PHYSICAL EXAM:  GENERAL: NAD, speaks in full sentences, no signs of respiratory distress  HEAD:  Atraumatic, Normocephalic  EYES: EOMI, PERRLA, conjunctiva and sclera clear  NECK: Supple, No JVD  PULM: CTAB; No wheeze; No crackles; No accessory muscles used  CVA: Regular rate and rhythm; No murmurs;   ABDOMEN: Soft, b/l lower quad mild tender to palpation, Nondistended; Bowel sounds present; No guarding  EXTREMITIES:  2+ Peripheral Pulses, No cyanosis or edema  PSYCH: AAOx3  NEUROLOGY: non-focal  SKIN: No rashes or lesions

## 2018-12-22 NOTE — PROGRESS NOTE ADULT - REASON FOR ADMISSION
Abdominal pain

## 2018-12-23 LAB — S TYPHI H D AB SER QL: ABNORMAL

## 2018-12-26 LAB — SURGICAL PATHOLOGY STUDY: SIGNIFICANT CHANGE UP

## 2018-12-27 LAB
FAT 72H STL-MCNT: SIGNIFICANT CHANGE UP G/24 H
FAT STL QL: SIGNIFICANT CHANGE UP H
SPECIMEN WT STL QN: 9 G — SIGNIFICANT CHANGE UP

## 2018-12-28 DIAGNOSIS — K52.9 NONINFECTIVE GASTROENTERITIS AND COLITIS, UNSPECIFIED: ICD-10-CM

## 2018-12-28 DIAGNOSIS — R10.9 UNSPECIFIED ABDOMINAL PAIN: ICD-10-CM

## 2018-12-28 DIAGNOSIS — E83.42 HYPOMAGNESEMIA: ICD-10-CM

## 2018-12-28 DIAGNOSIS — K64.4 RESIDUAL HEMORRHOIDAL SKIN TAGS: ICD-10-CM

## 2019-04-16 NOTE — PATIENT PROFILE ADULT - HAVE YOU EXPERIENCED VIOLENCE OR A TRAUMATIC EVENT?
I spoke with Roya, she is going to go the ER this afternoon for supplementation regarding K and Mg. She understands the importance and will go there as soon as she drops off meds at her mom's house.    Ruth Salazar NP     no

## 2019-05-28 NOTE — ED PROVIDER NOTE - ATTENDING CONTRIBUTION TO CARE
Patient settled in room 3028. Report received from Patricia ORTIZ. Room orientation completed, IPOC and education initiated.   43-year-old male denies significant past medical or past surgical history, non-smoker, presents with lower abdominal pain since Saturday morning (2 days).  Patient states pain is sharp, constant, radiates to the epigastric region, denies fever, n/v/d, anorexia, respiratory sx, urinary sx or other associated complaints at present.     VSS, awake, alert, appears uncomfortable but non-toxic appearing, lying in stretcher, in NAD, ears clear, no scleral icterus, oropharynx clear, mmm, no JVD or bruit, no jaundice, skin rash or lesions, chest CTAB, non-labored breathing, no w/r/r, +S1/S2, RRR, no m/r/g, abdomen soft, Mild to moderate diffuse tenderness to palpation without peritoneal signs, ND, +BS, no scars, hernias or distention, no pulsatile masses or bruits appreciated, The pt was examined with a chaperone (Dr. Pinzon); circumcised, external genitalia normal, testes descended bilaterally, no lesions or discoloration, no hernias noted in supine position, inguinal nodes are neither enlarged nor painful, no abnormalities noted in the urethra, epididymis appears normal bilaterally, cremasteric reflex intact, no urethral discharge, no CVA tenderness, no peripheral edema or deformities, alert, clear speech and steady gait.
